# Patient Record
Sex: FEMALE | Race: BLACK OR AFRICAN AMERICAN | NOT HISPANIC OR LATINO | URBAN - METROPOLITAN AREA
[De-identification: names, ages, dates, MRNs, and addresses within clinical notes are randomized per-mention and may not be internally consistent; named-entity substitution may affect disease eponyms.]

---

## 2017-03-24 ENCOUNTER — OFFICE VISIT (OUTPATIENT)
Dept: OPHTHALMOLOGY | Facility: CLINIC | Age: 32
End: 2017-03-24

## 2017-03-24 DIAGNOSIS — H40.003 GLAUCOMA SUSPECT, BILATERAL: Primary | ICD-10-CM

## 2017-03-24 ASSESSMENT — GONIOSCOPY
OS_INFERIOR: 4
OD_INFERIOR: 4
OD_TEMPORAL: 4
OD_SUPERIOR: 4
OD_NASAL: 4
OS_SUPERIOR: 4
OS_NASAL: 4
OS_TEMPORAL: 4

## 2017-03-24 ASSESSMENT — VISUAL ACUITY
METHOD: SNELLEN - LINEAR
OD_SC+: -2
OS_SC+: -2
OS_SC: 20/20
OD_SC: 20/40
OD_PH_SC: 20/20

## 2017-03-24 ASSESSMENT — EXTERNAL EXAM - RIGHT EYE: OD_EXAM: NORMAL

## 2017-03-24 ASSESSMENT — SLIT LAMP EXAM - LIDS
COMMENTS: NORMAL
COMMENTS: NORMAL

## 2017-03-24 ASSESSMENT — TONOMETRY
OD_IOP_MMHG: 26
OS_IOP_MMHG: 26
IOP_METHOD: APPLANATION
OD_IOP_MMHG: 23
IOP_METHOD: APPLANATION
OS_IOP_MMHG: 23

## 2017-03-24 ASSESSMENT — EXTERNAL EXAM - LEFT EYE: OS_EXAM: NORMAL

## 2017-03-24 ASSESSMENT — CUP TO DISC RATIO
OS_RATIO: 0.2
OD_RATIO: 0.2

## 2017-03-24 NOTE — NURSING NOTE
Chief Complaints and History of Present Illnesses   Patient presents with     Glaucoma Follow Up     HPI    Last Eye Exam:  6/29/16   Affected eye(s):  Both   Symptoms:        Duration:  9 months   Frequency:  Constant       Do you have eye pain now?:  No      Comments:  Pt returns for Glaucoma follow up. Feels that vision is stable, denies any visual changes or complaints. Notes that she has not used any Latanoprost since 6-2016 per plan at last visit. EDEN THOMPSON, COA 8:44 AM 03/24/2017

## 2017-03-24 NOTE — MR AVS SNAPSHOT
After Visit Summary   3/24/2017    Queenie Rutledge    MRN: [id number]           Patient Information     Date Of Birth          1985        Visit Information        Provider Department      3/24/2017 9:30 AM Zaira Wilson MD [facility]        Today's Diagnoses     Glaucoma suspect, bilateral    -  1      Care Instructions    A long discussion of the risks, benefits, and alternatives including potential treatment and management options were had with patient and a decision was made to remain off drops at this time.  Patient will return to clinic in 8-12 months with repeat visual field test, OCT with RNFL analysis, dilated eye exam and IOP check.            Follow-ups after your visit        Follow-up notes from your care team     Return 8-12 months with repeat visual field test, OCT with RNFL analysis, dilated eye exam and IOP check.  .      Your next 10 appointments already scheduled     Mar 24, 2017  9:30 AM CDT   Return Visit with Zaira Wilson MD   [facility] ([facility])    [facility]  [address] [address] [phone]              Who to contact     Please call your clinic at [phone] to:    Ask questions about your health    Make or cancel appointments    Discuss your medicines    Learn about your test results    Speak to your doctor   If you have compliments or concerns about an experience at your clinic, or if you wish to file a complaint, please contact [facility] Patient Relations at [phone] or email us at [email or web address]         Additional Information About Your Visit        MyChart Information     Triond is an electronic gateway that provides easy, online access to your medical records. With Triond, you can request a clinic appointment, read your test results, renew a prescription or communicate with  your care team.     To sign up for Smarter Remarketerhart visit the website at [email or web address]   You will be asked to enter the access code listed below, as well as some personal information. Please follow the directions to create your username and password.     Your access code is: [email or web address]  Expires: 2017  9:22 AM     Your access code will  in 90 days. If you need help or a new code, please contact your [facility] or call [phone] for assistance.        Care EveryWhere ID     This is your Care EveryWhere ID. This could be used by other organizations to access your [place] medical records  [id number]         Blood Pressure from Last 3 Encounters:   No data found for BP    Weight from Last 3 Encounters:   No data found for Wt              We Performed the Following     GONIOSCOPY     OCT Optic Nerve RNFL Spectralis OU (both eyes)     OVF 24-2 Dynamic OU        Primary Care Provider Office Phone # Fax #    Bharat Lopez [phone]       [place] [address]        Thank you!     Thank you for choosing [facility]  for your care. Our goal is always to provide you with excellent care. Hearing back from our patients is one way we can continue to improve our services. Please take a few minutes to complete the written survey that you may receive in the mail after your visit with us. Thank you!             Your Updated Medication List - Protect others around you: Learn how to safely use, store and throw away your medicines at www.disposemymeds.org.          This list is accurate as of: 3/24/17  9:22 AM.  Always use your most recent med list.                   Brand Name Dispense Instructions for use    DEPO-PROVERA IM          latanoprost 0.005 % ophthalmic solution    XALATAN    7.5 mL    Place 1 drop into both eyes At Bedtime

## 2017-03-24 NOTE — PATIENT INSTRUCTIONS
A long discussion of the risks, benefits, and alternatives including potential treatment and management options were had with patient and a decision was made to remain off drops at this time.  Patient will return to clinic in 8-12 months with repeat visual field test, OCT with RNFL analysis, dilated eye exam and IOP check.

## 2017-03-24 NOTE — PROGRESS NOTES
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  Patient will return to clinic in 8-12 months with repeat visual field test, OCT with RNFL analysis, dilated eye exam and IOP check.      Attending Physician Attestation:  Complete documentation of historical and exam elements from today's encounter can be found in the full encounter summary report (not reduplicated in this progress note). I personally obtained the chief complaint(s) and history of present illness.  I confirmed and edited as necessary the review of systems, past medical/surgical history, family history, social history, and examination findings as documented by others; and I examined the patient myself. I personally reviewed the relevant tests, images, and reports as documented above. I formulated and edited as necessary the assessment and plan and discussed the findings and management plan with the patient and family.  - Zaira Wilson MD 9:02 AM 3/24/2017

## 2018-02-21 ENCOUNTER — TELEPHONE (OUTPATIENT)
Dept: DERMATOLOGY | Facility: CLINIC | Age: 33
End: 2018-02-21

## 2018-02-21 NOTE — TELEPHONE ENCOUNTER
Niurka Solitario contacted Queenie on 02/21/18 and left a message. If patient calls back please inform them of their appointment and go over pre visit.  Niurka Solitario MA

## 2018-05-01 ENCOUNTER — TELEPHONE (OUTPATIENT)
Dept: DERMATOLOGY | Facility: CLINIC | Age: 33
End: 2018-05-01

## 2018-05-01 NOTE — TELEPHONE ENCOUNTER
Dermatology Pre-visit Call:    Reason for visit : Hidradenitis- possibly       Was the patient referred: No    Has the patient seen a dermatologist in the past: Yes     Patient Reminders Given:  --Please, make sure you bring an updated list of your medications.   --Plan on being in our facility for approximately one hour, this includes the registration process, office visit, education and check-out process.  If you are having a procedure, more time may be required.     --If you are having a procedure, please, present 15 minutes early.  --Location reviewed.   --If you need to cancel or reschedule, call XXXX  --We look forward to seeing you in Dermatology Clinic.

## 2018-05-03 ENCOUNTER — OFFICE VISIT (OUTPATIENT)
Dept: DERMATOLOGY | Facility: CLINIC | Age: 33
End: 2018-05-03
Payer: COMMERCIAL

## 2018-05-03 DIAGNOSIS — L02.92 FURUNCULOSIS: ICD-10-CM

## 2018-05-03 DIAGNOSIS — E66.09 OBESITY DUE TO EXCESS CALORIES WITHOUT SERIOUS COMORBIDITY, UNSPECIFIED CLASSIFICATION: Primary | ICD-10-CM

## 2018-05-03 RX ORDER — CLINDAMYCIN PHOSPHATE 10 MG/G
GEL TOPICAL 2 TIMES DAILY
Qty: 60 G | Refills: 11 | Status: SHIPPED | OUTPATIENT
Start: 2018-05-03 | End: 2019-08-30

## 2018-05-03 ASSESSMENT — PAIN SCALES - GENERAL: PAINLEVEL: NO PAIN (0)

## 2018-05-03 NOTE — PATIENT INSTRUCTIONS
We suspect you to either have mild HS or recurrent furunculosis. We will plan to treat you with the following:  Benzoyl peroxide wash daily in the shower  Clindamycin 1% gel 1-2 times daily    We will see you back as needed.

## 2018-05-03 NOTE — NURSING NOTE
Dermatology Rooming Note    Queenie Rutledge's goals for this visit include:   Chief Complaint   Patient presents with     Derm Problem     Queenie is here to discuss possibly having HS on her inner thighs. States it's been going on for a couple years.       Anai Shelby LPN

## 2018-05-03 NOTE — LETTER
5/3/2018       RE: Queenie Rutledge  [address] [place] [address] [address]     Dear Colleague,    Thank you for referring your patient, Queenie Rutledge, to the [facility] DERMATOLOGY at [facility]. Please see a copy of my visit note below.    [facility] Dermatology Note    Dermatology Problem List:  1. 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 States it's been going on for a couple years.     History of Present Illness:  Ms. Queenie Rutledge is a 33 year old female who presents for evaluation of HS. Pt states that she has been having problems for a couple years involving eruptions in her inner thighs/legs. She states that she has been following with a dermatologist at [facility] in [place] for this. She says that this dermatologist has not started any definitive treatment for this. She is presenting today for a second opinion. Pt states that today is a good day and does not have any active areas. She says that at times though she will have difficulty walking due to the pain when she has active spots on her inner thighs. Pt denies any hx of axillary, breast, or buttock involvement. She states that her outbreaks on her inner thighs seem to be random and she is not sure the frequency of her outbreaks. Pt is quite concerned about these occurrences and would like to know what can be done about them.  Pt states she is otherwise healthy without any other medical problems. Does not regularly take any medications. She does not smoke. No family hx of HS.  Pt otherwise feels well without any changes in general state of health. Denies any new, growing, changing, bleeding, or otherwise concerning/symptomatic skin findings. No other questions or concerns today.      Past Medical History:    Patient Active Problem List   Diagnosis     Glaucoma suspect     Glaucoma suspect, bilateral     Past Medical History:   Diagnosis Date     Glaucoma      Past Surgical History:   Procedure Laterality Date     LASIK BILATERAL Bilateral 03/02/10       Social History:  The patient works as a clinic manager. 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    Family History:  Breast cancer FHx. No FHx of autoimmune disease or HS.     Medications:  Current Outpatient Prescriptions   Medication Sig Dispense Refill     latanoprost (XALATAN) 0.005 % ophthalmic solution Place 1 drop into both eyes At Bedtime (Patient not taking: Reported on 5/3/2018) 7.5 mL 3     MedroxyPROGESTERone Acetate (DEPO-PROVERA IM)           Allergies   Allergen Reactions     Amoxicillin    Review of Systems:  -As per HPI  -Constitutional: The patient denies fatigue, fevers, chills, unintended weight loss, and night sweats.  -HEENT: Patient denies nonhealing oral sores.  -Skin: As above in HPI. No additional skin concerns.    Physical exam:  Vitals: There were no vitals taken for this visit.  GEN: This is a well developed, well-nourished female in no acute distress, in a pleasant mood.    SKIN: Full skin, which includes the head/face, both arms, chest, back, abdomen,both legs, genitalia and/or groin buttocks, digits and/or nails, was examined.  - FP V  - non-tender, slightly hyperpigmented dermal nodule on right upper medial thigh  - few scattered large comedones and cystic changes involving b/l superomedial thighs  - no active HS-appearing lesions in areas examined  - xerotic legs b/l  - no other lesions of concern on areas examined.     Impression/Plan:  1. 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 Pt with no active involvement on exam today. Difficult to definitively weigh in today due to lack of disease activity. No e/o scarring c/w HS. Does have some larger  comedones,involving b/l thighs. Ddx at this time includes mild HS vs recurrent furunculosis. Will begin  -  BPO 10% wash daily  - clindamycin 1% gel BID  - counseled in importance of weight reduction  - f/u as needed    2. Obesity  - Pt noted she was interested in learning about liposuction. I placed a consult for plastic surgery.    Follow-up prn for new or changing lesions.     Dr. May staffed the patient.    Staff Involved:  Resident(Alex Alaniz)/Staff(as above)    Staff Physician Comments:   I saw and evaluated the patient with the resident and I agree with the assessment and plan.  I was present for the examination.  Jay May MD, FAAD    Departments of Internal Medicine and Dermatology  [facility]  [phone]

## 2018-05-03 NOTE — MR AVS SNAPSHOT
After Visit Summary   5/3/2018    Queenie Rutledge    MRN: [id number]           Patient Information     Date Of Birth          1985        Visit Information        Provider Department      5/3/2018 3:00 PM Jay May MD [facility] Dermatology        Today's Diagnoses     Dermatitis    -  1      Care Instructions    We suspect you to either have mild HS or recurrent furunculosis. We will plan to treat you with the following:  Benzoyl peroxide wash daily in the shower  Clindamycin 1% gel 1-2 times daily    We will see you back as needed.          Follow-ups after your visit        Follow-up notes from your care team     Return if symptoms worsen or fail to improve.      Who to contact     Please call your clinic at [phone] to:    Ask questions about your health    Make or cancel appointments    Discuss your medicines    Learn about your test results    Speak to your doctor            Additional Information About Your Visit        MyChart Information     TeamLINKS is an electronic gateway that provides easy, online access to your medical records. With TeamLINKS, you can request a clinic appointment, read your test results, renew a prescription or communicate with your care team.     To sign up for CatchThatBust visit the website at [email or web address]   You will be asked to enter the access code listed below, as well as some personal information. Please follow the directions to create your username and password.     Your access code is: [email or web address]  Expires: 2018  7:30 AM     Your access code will  in 90 days. If you need help or a new code, please contact your [facility] or call [phone] for assistance.        Care EveryWhere ID     This is your Care EveryWhere ID. This could be used by other organizations to access your [place] medical records  [id number]         Blood Pressure from Last 3 Encounters:   No data found for BP    Weight from  Last 3 Encounters:   No data found for Wt              Today, you had the following     No orders found for display         Today's Medication Changes          These changes are accurate as of 5/3/18  4:19 PM.  If you have any questions, ask your nurse or doctor.               Start taking these medicines.        Dose/Directions    benzoyl peroxide 5 % Liqd   Used for:  Dermatitis   Started by:  Jay May MD        Use daily as directed   Quantity:  226 g   Refills:  3       clindamycin 1 % topical gel   Commonly known as:  CLINDAMAX   Used for:  Dermatitis   Started by:  Jay May MD        Apply topically 2 times daily   Quantity:  60 g   Refills:  11            Where to get your medicines      These medications were sent to [facility] [address] [place] [address]     Phone:  [phone]     benzoyl peroxide 5 % Liqd    clindamycin 1 % topical gel                Primary Care Provider Office Phone # Fax #    Bharat Lopez [phone]       [address] [address]        Equal Access to Services     [facility]: Hadii quirino riojas hadasho Soomaali, waaxda luqadaha, qaybta kaalmada adeegyada, michelle cheema . So [facility] [phone].    ATENCIÓN: Si habla español, tiene a viera disposición servicios gratuitos de asistencia lingüística. Llame al [phone].    We comply with applicable federal civil rights laws and [place] laws. We do not discriminate on the basis of race, color, national origin, age, disability, sex, sexual orientation, or gender identity.            Thank you!     Thank you for choosing [facility] DERMATOLOGY  for your care. Our goal is always to provide you with excellent care. Hearing back from our patients is one way we can continue to improve our services. Please take a few minutes to complete the  written survey that you may receive in the mail after your visit with us. Thank you!             Your Updated Medication List - Protect others around you: Learn how to safely use, store and throw away your medicines at www.disposemymeds.org.          This list is accurate as of 5/3/18  4:19 PM.  Always use your most recent med list.                   Brand Name Dispense Instructions for use Diagnosis    benzoyl peroxide 5 % Liqd     226 g    Use daily as directed    Dermatitis       clindamycin 1 % topical gel    CLINDAMAX    60 g    Apply topically 2 times daily    Dermatitis       DEPO-PROVERA IM           latanoprost 0.005 % ophthalmic solution    XALATAN    7.5 mL    Place 1 drop into both eyes At Bedtime    Glaucoma suspect, bilateral

## 2018-05-03 NOTE — PROGRESS NOTES
[facility] Dermatology Note    Dermatology Problem List:  1. 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 States it's been going on for a couple years.         History of Present Illness:  Ms. Queenie Rutledge is a 33 year old female who presents for evaluation of HS. Pt states that she has been having problems for a couple years involving eruptions in her inner thighs/legs. She states that she has been following with a dermatologist at [facility] in [place] for this. She says that this dermatologist has not started any definitive treatment for this. She is presenting today for a second opinion. Pt states that today is a good day and does not have any active areas. She says that at times though she will have difficulty walking due to the pain when she has active spots on her inner thighs. Pt denies any hx of axillary, breast, or buttock involvement. She states that her outbreaks on her inner thighs seem to be random and she is not sure the frequency of her outbreaks. Pt is quite concerned about these occurrences and would like to know what can be done about them.  Pt states she is otherwise healthy without any other medical problems. Does not regularly take any medications. She does not smoke. No family hx of HS.  Pt otherwise feels well without any changes in general state of health. Denies any new, growing, changing, bleeding, or otherwise concerning/symptomatic skin findings. No other questions or concerns today.      Past Medical History:   Patient Active Problem List   Diagnosis     Glaucoma suspect     Glaucoma suspect, bilateral     Past Medical History:   Diagnosis Date     Glaucoma      Past Surgical History:   Procedure Laterality Date     LASIK BILATERAL Bilateral 03/02/10       Social History:  The  patient works as a clinic manager. 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    Family History:  Breast cancer FHx. No FHx of autoimmune disease or HS.     Medications:  Current Outpatient Prescriptions   Medication Sig Dispense Refill     latanoprost (XALATAN) 0.005 % ophthalmic solution Place 1 drop into both eyes At Bedtime (Patient not taking: Reported on 5/3/2018) 7.5 mL 3     MedroxyPROGESTERone Acetate (DEPO-PROVERA IM)           Allergies   Allergen Reactions     Amoxicillin          Review of Systems:  -As per HPI  -Constitutional: The patient denies fatigue, fevers, chills, unintended weight loss, and night sweats.  -HEENT: Patient denies nonhealing oral sores.  -Skin: As above in HPI. No additional skin concerns.    Physical exam:  Vitals: There were no vitals taken for this visit.  GEN: This is a well developed, well-nourished female in no acute distress, in a pleasant mood.    SKIN: Full skin, which includes the head/face, both arms, chest, back, abdomen,both legs, genitalia and/or groin buttocks, digits and/or nails, was examined.  - FP V  - non-tender, slightly hyperpigmented dermal nodule on right upper medial thigh  - few scattered large comedones and cystic changes involving b/l superomedial thighs  - no active HS-appearing lesions in areas examined  - xerotic legs b/l  - no other lesions of concern on areas examined.     Impression/Plan:  1. 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 Pt with no active involvement on exam today. Difficult to definitively weigh in today due to lack of disease activity. No e/o scarring c/w HS. Does have some larger comedones,involving b/l thighs. Ddx at this time includes mild HS vs recurrent furunculosis. Will begin  -  BPO 10% wash daily  - clindamycin 1% gel BID  - counseled in importance of weight reduction  - f/u as needed    2. Obesity  - Pt noted she was interested in learning about  liposuction. I placed a consult for plastic surgery.    Follow-up prn for new or changing lesions.       Dr. May staffed the patient.    Staff Involved:  Resident(Alex Alaniz)/Staff(as above)    Staff Physician Comments:   I saw and evaluated the patient with the resident and I agree with the assessment and plan.  I was present for the examination.    Jay May MD, FAAD    Departments of Internal Medicine and Dermatology  [facility]  [phone]

## 2018-05-08 ENCOUNTER — TELEPHONE (OUTPATIENT)
Dept: DERMATOLOGY | Facility: CLINIC | Age: 33
End: 2018-05-08

## 2018-05-08 NOTE — TELEPHONE ENCOUNTER
[facility] Call Center    Phone Message    May a detailed message be left on voicemail: yes    Reason for Call: Other: Pt and Dr. May discussed inner thigh lipo during last appt.  Pt requesting a recommendation for a surgeon for this procedure from Dr. May.  Call pt to let her know.     Action Taken: Message routed to:  [facility] Dermatology Clinic

## 2018-05-11 NOTE — TELEPHONE ENCOUNTER
I spoke to Queenie Rutledge and gave her Dr. May's recommendations of Dr. Rendon or Dr. Oliva.  Patient understood and had no further questions or concerns.

## 2018-07-16 ENCOUNTER — TELEPHONE (OUTPATIENT)
Dept: DERMATOLOGY | Facility: CLINIC | Age: 33
End: 2018-07-16

## 2018-07-16 NOTE — TELEPHONE ENCOUNTER
[facility]    Phone Message    May a detailed message be left on voicemail: yes    Reason for Call: Other: Pt of Dr. May called requesting a prescription for Ibuprofen. Pt says the 200mgs OTC pills that she buys, she typically has to take between 600-800mgs. Pt stated she takes Ibuprofen to manage the condition she see's Dr. May for. Pt uses [address]     Action Taken: Message routed to:  [facility] Derm

## 2018-07-17 NOTE — TELEPHONE ENCOUNTER
Left voicemail asking to call clinic back to get her scheduled for a follow up with Dr. May. Want to know if she can come in at 6:30 pm on 8/2/18.  Clinic number provided.

## 2018-07-17 NOTE — TELEPHONE ENCOUNTER
Spoke with patient and relayed message. Queenie would like to request to be sent to Dr. May , routing to provider.

## 2018-07-17 NOTE — TELEPHONE ENCOUNTER
"Spoke with Queenie and relayed the message from MARCUS. Queenie states \" It is too hard to take all these pills and since I see Dr. May for my problem that's why I'm asking him.\" Patient adamant about receiving rx. Will route to MARCUS.   "

## 2019-07-30 DIAGNOSIS — J02.0 STREPTOCOCCAL SORE THROAT: Primary | ICD-10-CM

## 2019-07-30 DIAGNOSIS — J00 ACUTE NASOPHARYNGITIS: Primary | ICD-10-CM

## 2019-07-30 LAB
DEPRECATED S PYO AG THROAT QL EIA: NORMAL
SPECIMEN SOURCE: NORMAL

## 2019-07-30 PROCEDURE — 87880 STREP A ASSAY W/OPTIC: CPT | Performed by: INTERNAL MEDICINE

## 2019-07-30 PROCEDURE — 87081 CULTURE SCREEN ONLY: CPT | Performed by: INTERNAL MEDICINE

## 2019-07-31 LAB
BACTERIA SPEC CULT: NORMAL
SPECIMEN SOURCE: NORMAL

## 2019-08-26 ENCOUNTER — TELEPHONE (OUTPATIENT)
Dept: DERMATOLOGY | Facility: CLINIC | Age: 34
End: 2019-08-26

## 2019-08-26 NOTE — TELEPHONE ENCOUNTER
[facility]    Phone Message    May a detailed message be left on voicemail: yes    Reason for Call: Other: Pt would like appt with Dr. May, nothing on schedule. Please call pt to discuss.     Action Taken: Message routed to:  [facility] Derm

## 2019-08-30 ENCOUNTER — OFFICE VISIT (OUTPATIENT)
Dept: DERMATOLOGY | Facility: CLINIC | Age: 34
End: 2019-08-30
Payer: COMMERCIAL

## 2019-08-30 VITALS — DIASTOLIC BLOOD PRESSURE: 77 MMHG | SYSTOLIC BLOOD PRESSURE: 109 MMHG | HEART RATE: 83 BPM

## 2019-08-30 DIAGNOSIS — L73.2 HIDRADENITIS SUPPURATIVA: Primary | ICD-10-CM

## 2019-08-30 RX ORDER — CLINDAMYCIN PHOSPHATE 10 UG/ML
LOTION TOPICAL
Qty: 60 ML | Refills: 3 | Status: SHIPPED | OUTPATIENT
Start: 2019-08-30 | End: 2020-10-28

## 2019-08-30 ASSESSMENT — PAIN SCALES - GENERAL: PAINLEVEL: MODERATE PAIN (5)

## 2019-08-30 NOTE — PATIENT INSTRUCTIONS
You have a condition called hidradenitis suppurativa or recurrent furuculosis      PLAN TODAY:  - Keep using the benzoyl peroxide wash in the shower. This is a good treatment to use every day. I sent you another prescription but you can also keep getting it over the counter.  - When you get an inflamed bump, use the clindamycin lotion twice a day even if it is draining.  - Start back on the birth control with an OBGYN      The pill we talked about today is called spironolactone. This is a pill that works by affecting the hormone levels (androgens) that increase oil gland secretion. Side effects that we can see include breast tenderness, menstrual spotting, and lightheadedness but that usually goes away within the first few weeks.

## 2019-08-30 NOTE — PROGRESS NOTES
"[facility] Dermatology Note       Dermatology Problem List:  1. Mild hidradenitis suppurativa vs recurrent furunculosis - axilla, groin. Favor HS due to history of pilonidal cyst.  - Current tx: BPO 5% wash daily, Clindamycin 1% BID when flares, birth control  - Future considerations: Spironolactone    Encounter Date: Aug 30, 2019    CC:  Chief Complaint   Patient presents with     Derm Problem     HS - Queenie has a flare up in her groin and her right armpit         History of Present Illness:  Ms. Queenie Rutledge is a 34 year old female who presents as a follow-up for hidradenitis suppurativa. The patient was last seen 5/3/18 by Jay May MD, when she started benzoyl peroxide wash and clindamycin gel. Today, the patient reports that she had been doing well since her last visit, but she has since developed a lesion in her groin that she describes as a \"knot\" and believes to be a cyst which has been bleeding consistently with associated tenderness to palpation. There is a similar lesion in her right axilla that she states is not as severe and has not drained any contents. She has not used the topical clindamycin recently, but she has been using an OTC benzoyl peroxide wash intermittently when she believes she is developing a new lesion. She states that she develops lesions fairly regularly and seems to always have one present. Every time she develops a new lesion, there is a fair amount of associated pain, but she has been taking OTC ibuprofen with relief. She believes there may be a hormonal component as she typically develops lesions around her menstrual period, and she never developed these lesions when she was taking OCPs. Of note, when she was 18, she developed a lesion over her tailbone which was initially believed to be a pilonidal cyst, but she inquires as to whether this could be related to her existing lesions. The patient voices no other concerns.       Past Medical History: "   Patient Active Problem List   Diagnosis     Glaucoma suspect     Glaucoma suspect, bilateral     Past Medical History:   Diagnosis Date     Glaucoma      Past Surgical History:   Procedure Laterality Date     LASIK BILATERAL Bilateral 03/02/10       Social History:  Patient reports that she has never smoked. She has never used smokeless tobacco.    Family History:  Family History   Problem Relation Age of Onset     Diabetes No family hx of      Glaucoma No family hx of      Macular Degeneration No family hx of      Amblyopia No family hx of      Retinal detachment No family hx of      Thyroid Disease No family hx of      Melanoma No family hx of      Skin Cancer No family hx of        Medications:  Current Outpatient Medications   Medication Sig Dispense Refill     benzoyl peroxide 5 % external liquid Use daily as a body wash as directed. 226 g 11     clindamycin (CLEOCIN T) 1 % external lotion Apply twice daily to any inflamed bumps on the skin as needed. 60 mL 3     MedroxyPROGESTERone Acetate (DEPO-PROVERA IM)        latanoprost (XALATAN) 0.005 % ophthalmic solution Place 1 drop into both eyes At Bedtime (Patient not taking: Reported on 5/3/2018) 7.5 mL 3       Allergies   Allergen Reactions     Amoxicillin        Review of Systems:  -Constitutional: Otherwise feeling well today, in usual state of health.  -HEENT: Patient denies nonhealing oral sores.  -Skin: As above in HPI. No additional skin concerns.    Physical Exam:  Vitals: /77 (BP Location: Right arm, Patient Position: Sitting, Cuff Size: Adult Regular)   Pulse 83   GEN: This is a well developed, well-nourished female in no acute distress, in a pleasant mood.    SKIN: Focused examination of the axillae and groin was performed.  - Forman skin type: V  - 8mm subcutaneous papule in the R axilla with no overlying punctum. Axillae otherwise clear.  - Few hyperpigmented macules on the upper inner thighs consistent with post-inflammatory  hyperpigmentation.  - Draining papule on the left upper inner thigh.  - No other lesions of concern on areas examined.       Impression/Plan:  1. Favor mild hidradenitis suppurativa vs less likely recurrent furunculosis. Hidradenitis suppurativa is favored due to patient presentation and history of pilonidal cyst at age 18.    We discussed the potential etiologies as well as the risks, benefits, and efficacy of treatment with a topical regimen or addition of oral medications such as contraceptives or spironolactone. Today, we will optimize her topical regimen and patient will see OB/GYN to be restarted on her birth control (Depot injection), as she noted improvement in the past    Offered intralesional triamcinolone today for two inflamed areas, however patient declines as they seem to be resolving on their own.    Restart daily benzoyl peroxide 5% wash in the shower. Warned of bleached fabrics.    Restart topical clindamycin 1% lotion to use twice daily when inflamed lesions.    An option for future consideration at next appointment is spironolactone given her reported history of menstrual flares.      CC Bharat Lopez MD  [facility]  [address] [place] [address] on close of this encounter.  Follow-up in 3-6 months, earlier for new or changing lesions.     Dr. Cameron staffed the patient      Staff Involved:  Scribe/Resident (Marjan Gonsaelz)/Staff    Scribe Disclosure  I, Dominic Najjar, am serving as a scribe to document services personally performed by Dr. Navarro Cameron MD, based on data collection and the provider's statements to me.    Staff Physician Comments:   I saw and evaluated the patient with the resident and I edited the assessment and plan as documented in the note. I was present for the examination.    Provider Disclosure:   The documentation recorded by the scribe accurately reflects the services I personally performed and the decisions made by me.    Navarro Cameron,  MD   of Dermatology  Department of Dermatology  [employer] [employer] [facility]

## 2019-08-30 NOTE — LETTER
"8/30/2019       RE: Queenie Rutledge  [address] [facility] [facility] [address]     Dear Colleague,    Thank you for referring your patient, Queenie Rutledge, to the [facility] DERMATOLOGY at [facility]. Please see a copy of my visit note below.    [facility] Dermatology Note       Dermatology Problem List:  1. Mild hidradenitis suppurativa vs recurrent furunculosis - axilla, groin. Favor HS due to history of pilonidal cyst.  - Current tx: BPO 5% wash daily, Clindamycin 1% BID when flares, birth control  - Future considerations: Spironolactone    Encounter Date: Aug 30, 2019    CC:  Chief Complaint   Patient presents with     Derm Problem     HS - Queenie has a flare up in her groin and her right armpit         History of Present Illness:  Ms. Queenie Rutledge is a 34 year old female who presents as a follow-up for hidradenitis suppurativa. The patient was last seen 5/3/18 by Jay May MD, when she started benzoyl peroxide wash and clindamycin gel. Today, the patient reports that she had been doing well since her last visit, but she has since developed a lesion in her groin that she describes as a \"knot\" and believes to be a cyst which has been bleeding consistently with associated tenderness to palpation. There is a similar lesion in her right axilla that she states is not as severe and has not drained any contents. She has not used the topical clindamycin recently, but she has been using an OTC benzoyl peroxide wash intermittently when she believes she is developing a new lesion. She states that she develops lesions fairly regularly and seems to always have one present. Every time she develops a new lesion, there is a fair amount of associated pain, but she has been taking OTC ibuprofen with relief. She believes there may be a hormonal component as she typically develops lesions around her menstrual period, and she never developed these lesions when she was taking " OCPs. Of note, when she was 18, she developed a lesion over her tailbone which was initially believed to be a pilonidal cyst, but she inquires as to whether this could be related to her existing lesions. The patient voices no other concerns.       Past Medical History:   Patient Active Problem List   Diagnosis     Glaucoma suspect     Glaucoma suspect, bilateral     Past Medical History:   Diagnosis Date     Glaucoma      Past Surgical History:   Procedure Laterality Date     LASIK BILATERAL Bilateral 03/02/10       Social History:  Patient reports that she has never smoked. She has never used smokeless tobacco.    Family History:  Family History   Problem Relation Age of Onset     Diabetes No family hx of      Glaucoma No family hx of      Macular Degeneration No family hx of      Amblyopia No family hx of      Retinal detachment No family hx of      Thyroid Disease No family hx of      Melanoma No family hx of      Skin Cancer No family hx of        Medications:  Current Outpatient Medications   Medication Sig Dispense Refill     benzoyl peroxide 5 % external liquid Use daily as a body wash as directed. 226 g 11     clindamycin (CLEOCIN T) 1 % external lotion Apply twice daily to any inflamed bumps on the skin as needed. 60 mL 3     MedroxyPROGESTERone Acetate (DEPO-PROVERA IM)        latanoprost (XALATAN) 0.005 % ophthalmic solution Place 1 drop into both eyes At Bedtime (Patient not taking: Reported on 5/3/2018) 7.5 mL 3       Allergies   Allergen Reactions     Amoxicillin        Review of Systems:  -Constitutional: Otherwise feeling well today, in usual state of health.  -HEENT: Patient denies nonhealing oral sores.  -Skin: As above in HPI. No additional skin concerns.    Physical Exam:  Vitals: /77 (BP Location: Right arm, Patient Position: Sitting, Cuff Size: Adult Regular)   Pulse 83   GEN: This is a well developed, well-nourished female in no acute distress, in a pleasant mood.    SKIN: Focused  examination of the axillae and groin was performed.  - Forman skin type: V  - 8mm subcutaneous papule in the R axilla with no overlying punctum. Axillae otherwise clear.  - Few hyperpigmented macules on the upper inner thighs consistent with post-inflammatory hyperpigmentation.  - Draining papule on the left upper inner thigh.  - No other lesions of concern on areas examined.       Impression/Plan:  1. Favor mild hidradenitis suppurativa vs less likely recurrent furunculosis. Hidradenitis suppurativa is favored due to patient presentation and history of pilonidal cyst at age 18.    We discussed the potential etiologies as well as the risks, benefits, and efficacy of treatment with a topical regimen or addition of oral medications such as contraceptives or spironolactone. Today, we will optimize her topical regimen and patient will see OB/GYN to be restarted on her birth control (Depot injection), as she noted improvement in the past    Offered intralesional triamcinolone today for two inflamed areas, however patient declines as they seem to be resolving on their own.    Restart daily benzoyl peroxide 5% wash in the shower. Warned of bleached fabrics.    Restart topical clindamycin 1% lotion to use twice daily when inflamed lesions.    An option for future consideration at next appointment is spironolactone given her reported history of menstrual flares.      CC Bharat Lopez MD  [facility]  [address] [place] [address] on close of this encounter.  Follow-up in 3-6 months, earlier for new or changing lesions.     Dr. Cameron staffed the patient      Staff Involved:  Scribe/Resident (Marjan Gonsalez)/Staff    Scribe Disclosure  I, Dominic Najjar, am serving as a scribe to document services personally performed by Dr. Navarro Cameron MD, based on data collection and the provider's statements to me.    Staff Physician Comments:   I saw and evaluated the patient with the resident and I edited  the assessment and plan as documented in the note. I was present for the examination.    Provider Disclosure:   The documentation recorded by the scribe accurately reflects the services I personally performed and the decisions made by me.    Navarro Cameron MD   of Dermatology  Department of Dermatology  [employer]

## 2019-11-18 ENCOUNTER — OFFICE VISIT (OUTPATIENT)
Dept: DERMATOLOGY | Facility: CLINIC | Age: 34
End: 2019-11-18
Payer: COMMERCIAL

## 2019-11-18 VITALS — HEART RATE: 75 BPM | SYSTOLIC BLOOD PRESSURE: 109 MMHG | DIASTOLIC BLOOD PRESSURE: 71 MMHG

## 2019-11-18 DIAGNOSIS — L73.2 HIDRADENITIS SUPPURATIVA: Primary | ICD-10-CM

## 2019-11-18 ASSESSMENT — PAIN SCALES - GENERAL: PAINLEVEL: NO PAIN (0)

## 2019-11-18 NOTE — PROGRESS NOTES
[facility] Dermatology Note      Dermatology Problem List:  1. Mild hidradenitis suppurativa vs recurrent furunculosis - axilla, groin. Favor HS due to history of pilonidal cyst.  - Current tx: BPO 5% wash daily, Clindamycin 1% BID when flares, birth control  - Future considerations: Spironolactone    Encounter Date: Nov 18, 2019    CC:  Chief Complaint   Patient presents with     Derm Problem     Queenie is here today for a 3 month HS follow up          History of Present Illness:  Ms. Queenie Rutledge is a 34 year old female who presents as a follow-up for hidradenitis suppurativa. The patient was last seen 8/30/19 when the patient was continued on treatment plan of BPO 5% wash daily and Clindamycin 1% BID when flares and recommended starting birth control. Patient reports starting Depo-Provera on 9/1/19. She states it has been effective in preventing flares of red bumps in the groin and armpits. Since her last visit in August she has only had one flare on right medial thigh that has been present for one week. It is not painful, itchy or draining. She has another lesion on her left medial thigh that was present at last visit. Since then the skin of this lesion has become more soft and fragile as if it can easily rupture. Patient reports she has been using BPO wash less frequently since the Depo-Provera has been effective in controlling her flares. She reports using BPO wash once every 2 weeks. Patient also reports a 20 lb weight loss since last visit that she attributes to diet and exercise. Patient reports no other concerns.    Past Medical History:   Patient Active Problem List   Diagnosis     Glaucoma suspect     Glaucoma suspect, bilateral     Past Medical History:   Diagnosis Date     Glaucoma      Past Surgical History:   Procedure Laterality Date     LASIK BILATERAL Bilateral 03/02/10       Social History:  Patient reports that she has never smoked. She has never used smokeless tobacco.  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 She is single, no children, and lives in [place].     Family History:  Family History   Problem Relation Age of Onset     Diabetes No family hx of      Glaucoma No family hx of      Macular Degeneration No family hx of      Amblyopia No family hx of      Retinal detachment No family hx of      Thyroid Disease No family hx of      Melanoma No family hx of      Skin Cancer No family hx of        Medications:  Current Outpatient Medications   Medication Sig Dispense Refill     benzoyl peroxide 5 % external liquid Use daily as a body wash as directed. 226 g 11     chlorhexidine (HIBICLENS) 4 % liquid Apply topically daily 473 mL 5     clindamycin (CLEOCIN T) 1 % external lotion Apply twice daily to any inflamed bumps on the skin as needed. 60 mL 3     MedroxyPROGESTERone Acetate (DEPO-PROVERA IM)        latanoprost (XALATAN) 0.005 % ophthalmic solution Place 1 drop into both eyes At Bedtime (Patient not taking: Reported on 5/3/2018) 7.5 mL 3     Allergies   Allergen Reactions     Amoxicillin          Review of Systems:  -As per HPI  -Constitutional: Otherwise feeling well today, in usual state of health.  -HEENT: Patient denies nonhealing oral sores.  -Skin: As above in HPI. No additional skin concerns.    Physical exam:  Vitals: /71   Pulse 75   GEN: This is a well developed, well-nourished female in no acute distress, in a pleasant mood.    SKIN: Focused examination of the groin was performed.  -Forman skin type: VI  -1 cm pink atrophic scar on left medial thigh  -1 mm tan subcutaneous folliculocentric papule on right medial thigh  -1 mm tan subcutaneous folliculocentric papule on right axilla  -few scattered comedones in the groin  -No other lesions of concern on areas examined.     Impression/Plan:  1. Hidradenitis suppurativa vs less likely recurrent furunculosis. Hidradenitis suppurativa is favored due to patient  presentation and history of pilonidal cyst at age 18. Minimal disease activity    Continue Depo-Provera    Stop benzoyl peroxide 5% wash     Start chlorhexadine 4% wash     Continue topical clindamycin 1% lotion 2x daily when inflamed lesions    IL triamcinolone discussed as option for future consideration when inflamed lesions    Spironolactone discuessed as option for future consideration.    Follow-up in 6 months, earlier for new or changing lesions.     Staff Involved:  I,Dominguez Lei, MS3, saw and examined the patient in the presence of Dr. Cameron.    Staff Physician:  I was present with the medical student who participated in the service and in the documentation of the note. I have verified the history and personally performed the physical exam and medical decision making. I edited the assessment and plan of care as documented in the note.     Navarro Cameron MD   of Dermatology  Department of Dermatology  [facility] [employer] [facility]

## 2019-11-18 NOTE — LETTER
11/18/2019       RE: Queenie Rutledge  [address] [address]     Dear Colleague,    Thank you for referring your patient, Queenie Rutledge, to the [facility] DERMATOLOGY at [facility]. Please see a copy of my visit note below.    [facility] Dermatology Note      Dermatology Problem List:  1. Mild hidradenitis suppurativa vs recurrent furunculosis - axilla, groin. Favor HS due to history of pilonidal cyst.  - Current tx: BPO 5% wash daily, Clindamycin 1% BID when flares, birth control  - Future considerations: Spironolactone    Encounter Date: Nov 18, 2019    CC:  Chief Complaint   Patient presents with     Derm Problem     Queenie is here today for a 3 month HS follow up          History of Present Illness:  Ms. Queenie Rutledge is a 34 year old female who presents as a follow-up for hidradenitis suppurativa. The patient was last seen 8/30/19 when the patient was continued on treatment plan of BPO 5% wash daily and Clindamycin 1% BID when flares and recommended starting birth control. Patient reports starting Depo-Provera on 9/1/19. She states it has been effective in preventing flares of red bumps in the groin and armpits. Since her last visit in August she has only had one flare on right medial thigh that has been present for one week. It is not painful, itchy or draining. She has another lesion on her left medial thigh that was present at last visit. Since then the skin of this lesion has become more soft and fragile as if it can easily rupture. Patient reports she has been using BPO wash less frequently since the Depo-Provera has been effective in controlling her flares. She reports using BPO wash once every 2 weeks. Patient also reports a 20 lb weight loss since last visit that she attributes to diet and exercise. Patient reports no other concerns.    Past Medical History:   Patient Active Problem List   Diagnosis     Glaucoma suspect      Glaucoma suspect, bilateral     Past Medical History:   Diagnosis Date     Glaucoma      Past Surgical History:   Procedure Laterality Date     LASIK BILATERAL Bilateral 03/02/10       Social History:  Patient reports that she has never smoked. She has never used smokeless tobacco. 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     Family History:  Family History   Problem Relation Age of Onset     Diabetes No family hx of      Glaucoma No family hx of      Macular Degeneration No family hx of      Amblyopia No family hx of      Retinal detachment No family hx of      Thyroid Disease No family hx of      Melanoma No family hx of      Skin Cancer No family hx of        Medications:  Current Outpatient Medications   Medication Sig Dispense Refill     benzoyl peroxide 5 % external liquid Use daily as a body wash as directed. 226 g 11     chlorhexidine (HIBICLENS) 4 % liquid Apply topically daily 473 mL 5     clindamycin (CLEOCIN T) 1 % external lotion Apply twice daily to any inflamed bumps on the skin as needed. 60 mL 3     MedroxyPROGESTERone Acetate (DEPO-PROVERA IM)        latanoprost (XALATAN) 0.005 % ophthalmic solution Place 1 drop into both eyes At Bedtime (Patient not taking: Reported on 5/3/2018) 7.5 mL 3     Allergies   Allergen Reactions     Amoxicillin          Review of Systems:  -As per HPI  -Constitutional: Otherwise feeling well today, in usual state of health.  -HEENT: Patient denies nonhealing oral sores.  -Skin: As above in HPI. No additional skin concerns.    Physical exam:  Vitals: /71   Pulse 75   GEN: This is a well developed, well-nourished female in no acute distress, in a pleasant mood.    SKIN: Focused examination of the groin was performed.  -Forman skin type: VI  -1 cm pink atrophic scar on left medial thigh  -1 mm tan subcutaneous folliculocentric papule on right medial thigh  -1 mm tan  subcutaneous folliculocentric papule on right axilla  -few scattered comedones in the groin  -No other lesions of concern on areas examined.     Impression/Plan:  1. Hidradenitis suppurativa vs less likely recurrent furunculosis. Hidradenitis suppurativa is favored due to patient presentation and history of pilonidal cyst at age 18. Minimal disease activity    Continue Depo-Provera    Stop benzoyl peroxide 5% wash     Start chlorhexadine 4% wash     Continue topical clindamycin 1% lotion 2x daily when inflamed lesions    IL triamcinolone discussed as option for future consideration when inflamed lesions    Spironolactone discuessed as option for future consideration.    Follow-up in 6 months, earlier for new or changing lesions.     Staff Involved:  I,Dominguez Lei, MS3, saw and examined the patient in the presence of Dr. Cameron.    Staff Physician:  I was present with the medical student who participated in the service and in the documentation of the note. I have verified the history and personally performed the physical exam and medical decision making. I edited the assessment and plan of care as documented in the note.     Navarro Cameron MD   of Dermatology  Department of Dermatology  [facility]

## 2020-03-10 ENCOUNTER — HEALTH MAINTENANCE LETTER (OUTPATIENT)
Age: 35
End: 2020-03-10

## 2020-06-19 ENCOUNTER — TELEPHONE (OUTPATIENT)
Dept: OPHTHALMOLOGY | Facility: CLINIC | Age: 35
End: 2020-06-19

## 2020-06-19 NOTE — TELEPHONE ENCOUNTER
H/o glaucoma suspect/LASIK in 2010  Pt last seen in 2017    Pt reporting more constant blurring of vision in both eyes in past month     Offered appt next Monday or following week in [facility]    Scheduled July 6th in afternoon with Dr. Orozco    Pt aware of date/time/location at [facility]  Tre Simomns RN 12:53 PM 06/25/20    --    Left message with direct number at [phone]  Tre Simmons RN 8:50 AM 06/25/20        [facility] Center    Phone Message    May a detailed message be left on voicemail: yes     Reason for Call: Other: Pt requesting eye exam, she states she was a [facility] pt. Her last visit was with Dr. Wilson in 3/2017. Pt states she has a hx of glaucoma, and notes she has not had changes in her vision, but is requesting a sooner appt with clinic. Please advise.     Action Taken: Message routed to:  [facility] Ophthalmology    Travel Screening: Not Applicable

## 2020-07-06 ENCOUNTER — OFFICE VISIT (OUTPATIENT)
Dept: OPHTHALMOLOGY | Facility: CLINIC | Age: 35
End: 2020-07-06
Attending: OPHTHALMOLOGY
Payer: COMMERCIAL

## 2020-07-06 DIAGNOSIS — H40.003 GLAUCOMA SUSPECT OF BOTH EYES: Primary | ICD-10-CM

## 2020-07-06 DIAGNOSIS — H52.7 REFRACTIVE ERROR: ICD-10-CM

## 2020-07-06 DIAGNOSIS — Z98.890 S/P LASIK (LASER ASSISTED IN SITU KERATOMILEUSIS) OF BOTH EYES: ICD-10-CM

## 2020-07-06 PROCEDURE — 92015 DETERMINE REFRACTIVE STATE: CPT | Mod: ZF

## 2020-07-06 PROCEDURE — 92133 CPTRZD OPH DX IMG PST SGM ON: CPT | Mod: ZF | Performed by: STUDENT IN AN ORGANIZED HEALTH CARE EDUCATION/TRAINING PROGRAM

## 2020-07-06 PROCEDURE — G0463 HOSPITAL OUTPT CLINIC VISIT: HCPCS | Mod: ZF

## 2020-07-06 PROCEDURE — 92083 EXTENDED VISUAL FIELD XM: CPT | Mod: ZF | Performed by: STUDENT IN AN ORGANIZED HEALTH CARE EDUCATION/TRAINING PROGRAM

## 2020-07-06 ASSESSMENT — TONOMETRY
IOP_METHOD: APPLANATION
OS_IOP_MMHG: 20
OD_IOP_MMHG: 19

## 2020-07-06 ASSESSMENT — VISUAL ACUITY
OD_SC: 20/60
OD_PH_SC+: -2
OD_PH_SC: 20/25
OS_SC: 20/25
METHOD: SNELLEN - LINEAR
OS_SC+: -2
OD_SC+: +2

## 2020-07-06 ASSESSMENT — CUP TO DISC RATIO
OS_RATIO: 0.2
OD_RATIO: 0.2

## 2020-07-06 ASSESSMENT — CONF VISUAL FIELD
OS_NORMAL: 1
OD_NORMAL: 1
METHOD: COUNTING FINGERS

## 2020-07-06 ASSESSMENT — REFRACTION_MANIFEST
OD_AXIS: 085
OD_SPHERE: -3.00
OS_CYLINDER: +0.75
OD_CYLINDER: +0.75
OS_SPHERE: -2.50
OS_AXIS: 075

## 2020-07-06 ASSESSMENT — EXTERNAL EXAM - LEFT EYE: OS_EXAM: NORMAL

## 2020-07-06 ASSESSMENT — REFRACTION_WEARINGRX
OS_CYLINDER: +0.75
OS_AXIS: 030
OD_SPHERE: -1.00
OD_CYLINDER: SPHERE
OS_SPHERE: -0.75
SPECS_TYPE: MRX

## 2020-07-06 ASSESSMENT — EXTERNAL EXAM - RIGHT EYE: OD_EXAM: NORMAL

## 2020-07-06 ASSESSMENT — SLIT LAMP EXAM - LIDS
COMMENTS: NORMAL
COMMENTS: NORMAL

## 2020-07-06 NOTE — PROGRESS NOTES
HPI  Queenie Rutledge is a 35 year old female with prior hx concerning for glaucoma suspect vs JOAG vs OHT. She was first told about glaucoma diagnosis in 1998 but has hx of non-compliance with drops. During her last visit with Dr. Wilson in 2017, it was decided to monitor without drops. She had lasik in 2010 but is here for increased blurriness. Reports occational headaches, but no nausea, vomiting, temporary vision loss, flashes, floaters    Assessment & Plan      (H40.003) Glaucoma suspect of both eyes  (primary encounter diagnosis)  Comment: Diagnosed in 1998 as OHT vs JOAG vs glaucoma suspect. Previously on Cosopt but discontinued by Dr. Wilson 3 years ago  -Thick Pachy (618/643 in 2017)  - FHx: Grandmother? Unknown paternal hx  - T max: 28/29  Plan:   -OVF 24-2 Dynamic each eye (7/6/20)- ?Nasal step vs poor reliability in right eye, left eye normal  -OCT Optic Nerve RNFL (7/6/20)- stable nerves each eye with no thinning on imaging  - Nerves are symmetrical with c/d of 0.2  - Will monitor off drops  - RTC in 1 year for RNFL OCT, OVF 24-2    (H52.7) Refractive error  (Z98.890) s/p LASIK of both eyes  Comment: Pt interested in another LASIK surgery as her vision has worsened. Discussed risk and benefits  Plan: Glasses prescription given    -----------------------------------------------------------------------------------    Patient disposition:   Return in about 1 year (around 7/6/2021) for RNFL OCT, HVF. or sooner as needed.    Sang Orozco MD  Ophthalmology Resident, PGY-3    Teaching statement:  Complete documentation of historical and exam elements from today's encounter can be found in the full encounter summary report (not reduplicated in this progress note). I personally obtained the chief complaint(s) and history of present illness.  I confirmed and edited as necessary the review of systems, past medical/surgical history, family history, social history, and examination findings as documented by others; and  I examined the patient myself. I personally reviewed the relevant tests, images, and reports as documented above.     I formulated and edited as necessary the assessment and plan and discussed the findings and management plan with the patient and family.    Maryana Plummer MD  Comprehensive Ophthalmology & Ocular Pathology  Department of Ophthalmology and Visual Neurosciences  [email or web address]  Pager [phone]

## 2020-07-06 NOTE — NURSING NOTE
Chief Complaints and History of Present Illnesses   Patient presents with     Glaucoma Suspect Follow Up     3 year follow up both eyes.     Chief Complaint(s) and History of Present Illness(es)     Glaucoma Suspect Follow Up     Laterality: both eyes    Comments: 3 year follow up both eyes.              Comments     Pt states vision appearing blurry in both eyes since allergy season.  No eye pain today. Floaters in both eyes frequently, no changes.  No flashes of light.   Light headaches recently, pt does not use any medication for relief.    Pt does not use any drops.    ARELI Ace July 6, 2020 2:02 PM

## 2020-09-01 ENCOUNTER — TELEPHONE (OUTPATIENT)
Dept: OPHTHALMOLOGY | Facility: CLINIC | Age: 35
End: 2020-09-01

## 2020-09-01 NOTE — TELEPHONE ENCOUNTER
Reviewed no indication per last note could not wear contact lenses--scheduled with Dr. Arellano this Friday     Reviewed 6 or 7 LASIK providers in area pt may reach out to    Pt seemed satisfied with information    Tre Simmons RN 3:23 PM 09/01/20        Queenie Rutledge [phone]    Left message with direct number at [phone]  Tre Simmons RN 9:54 AM 09/01/20         Health Call Center    Phone Message    May a detailed message be left on voicemail: yes     Reason for Call: Other: Pt called in on 8/17 and is following up waiting for a response. Notes she has had Lasik and is now wondering if she can therefore wear contacts instead of glasses. Would like to know if we see Lasik Pts for contact exams. Please call her back - notes she can also be reached via [employer] message. Thank you    Action Taken: Message routed to:  [facility] EYE    Travel Screening: Not Applicable

## 2020-09-16 ENCOUNTER — OFFICE VISIT (OUTPATIENT)
Dept: OPTOMETRY | Facility: CLINIC | Age: 35
End: 2020-09-16
Payer: COMMERCIAL

## 2020-09-16 DIAGNOSIS — H52.13 MYOPIA, BILATERAL: Primary | ICD-10-CM

## 2020-09-16 ASSESSMENT — REFRACTION_WEARINGRX
OD_SPHERE: -3.00
OD_CYLINDER: +0.75
OS_SPHERE: -2.50
SPECS_TYPE: LAST MRX
OS_AXIS: 075
OS_CYLINDER: +0.75
OD_AXIS: 085

## 2020-09-16 ASSESSMENT — REFRACTION_CURRENTRX
OD_DIAMETER: 14.3
OS_BASECURVE: 8.5
OD_SPHERE: -2.25
OS_BRAND: ACUVUE OASYS 1 DAY
OD_BRAND: ACUVUE OASYS 1 DAY
OS_DIAMETER: 14.3
OD_BASECURVE: 8.5
OS_SPHERE: -0.75

## 2020-09-16 ASSESSMENT — REFRACTION_MANIFEST
OD_AXIS: 090
OS_CYLINDER: SPHERE
OD_CYLINDER: +0.75
OD_SPHERE: -2.50
OS_SPHERE: -0.75

## 2020-09-16 ASSESSMENT — VISUAL ACUITY
METHOD: SNELLEN - LINEAR
OS_SC: 20/25
OD_SC: 20/60-2

## 2020-09-16 ASSESSMENT — SLIT LAMP EXAM - LIDS
COMMENTS: NORMAL
COMMENTS: NORMAL

## 2020-09-16 ASSESSMENT — EXTERNAL EXAM - LEFT EYE: OS_EXAM: NORMAL

## 2020-09-16 ASSESSMENT — EXTERNAL EXAM - RIGHT EYE: OD_EXAM: NORMAL

## 2020-09-16 NOTE — PROGRESS NOTES
A/P  1.) Myopia right eye>left eye, s/p LASIK each eye  -Corrects well with myopic Rx. Previous CL wearer  -Good vision/comfort/fit in AV Oasys 1 Day. Pt demo'd I&R in office  -Reviewed CL care and hygiene. Okay to order if working well. Contact the clinic with any CL concerns    Monitor 1-2 years routine

## 2020-10-24 DIAGNOSIS — L73.2 HIDRADENITIS SUPPURATIVA: ICD-10-CM

## 2020-10-27 ENCOUNTER — MYC MEDICAL ADVICE (OUTPATIENT)
Dept: DERMATOLOGY | Facility: CLINIC | Age: 35
End: 2020-10-27

## 2020-10-27 ENCOUNTER — TELEPHONE (OUTPATIENT)
Dept: DERMATOLOGY | Facility: CLINIC | Age: 35
End: 2020-10-27

## 2020-10-27 RX ORDER — CLINDAMYCIN PHOSPHATE 10 UG/ML
LOTION TOPICAL
Qty: 60 ML | Refills: 3 | OUTPATIENT
Start: 2020-10-27

## 2020-10-27 NOTE — TELEPHONE ENCOUNTER
"Spoke with patient and after looking further into her chart, 04/06/18 she saw Dr. Aguillon and the note states, \"Took cephalexin (KEFLEX) 500 mg capsule and finished\". The patient would like this medication again because she has been using the benzoyl peroxide 5 % external liquid and clindamycin (CLEOCIN T) 1 % external lotion for 3 weeks with very small improvement. Informed the patient that I will talk to the [facility] and that she might need to be seen since we have no prescribed this medication in the past. She stated she hopes the next opening isnt weeks away like it usually is.    Da Martínez, [facility]     "

## 2020-10-27 NOTE — TELEPHONE ENCOUNTER
Mickey Stephens MD [place], Kayrene, [facility]   Caller: Unspecified (Today,  3:00 PM)   Phone Number: [phone]             It seems like an inflamed pilonidal cyst. She should be seen asap for consideration of ILK, oral antibiotics (eg doxy), and maybe I&D     Thank you!   If it's too long of a time before an appt, I can do 7 days of doxy

## 2020-10-27 NOTE — TELEPHONE ENCOUNTER
----- Message from Makenna Kerns RN sent at 10/27/2020  6:31 AM CDT -----  Regarding: appointment DERMATOLOGY  Please call patient to schedule Dermatology appointment.  Last visit 11/18/2019 [facility].  Plan RTC 6 months.  #recall list dated 5/18/2020.  Appointment needed for medication refills.     Thank you, Makenna SCHERER RN Med Refill Team    I do not need any follow up on the scheduling of this appointment unless the patient will no longer be receiving care in our clinic.

## 2020-10-27 NOTE — TELEPHONE ENCOUNTER
Refill refused.  Patient needs to make appointment. Pharmacy called- voicemail message left.  Message was sent Derm scheduling.

## 2020-10-28 ENCOUNTER — OFFICE VISIT (OUTPATIENT)
Dept: DERMATOLOGY | Facility: CLINIC | Age: 35
End: 2020-10-28
Payer: COMMERCIAL

## 2020-10-28 DIAGNOSIS — L73.2 HIDRADENITIS SUPPURATIVA: ICD-10-CM

## 2020-10-28 PROCEDURE — 11900 INJECT SKIN LESIONS </W 7: CPT | Performed by: PHYSICIAN ASSISTANT

## 2020-10-28 PROCEDURE — 99212 OFFICE O/P EST SF 10 MIN: CPT | Mod: 25 | Performed by: PHYSICIAN ASSISTANT

## 2020-10-28 RX ORDER — CLINDAMYCIN PHOSPHATE 10 UG/ML
LOTION TOPICAL
Qty: 60 ML | Refills: 3 | Status: SHIPPED | OUTPATIENT
Start: 2020-10-28 | End: 2022-04-28

## 2020-10-28 ASSESSMENT — PAIN SCALES - GENERAL
PAINLEVEL: NO PAIN (0)
PAINLEVEL: MILD PAIN (2)

## 2020-10-28 NOTE — LETTER
"10/28/2020       RE: Queenie Rutledge  [address] [facility] [address]     Dear Colleague,    Thank you for referring your patient, Queenie Rutledge, to the [facility]ERMATOLOGY [facility] [place] at [facility]. Please see a copy of my visit note below.    Drug Administration Record    Prior to injection, verified patient identity using patient's name and date of birth.  Due to injection administration, patient instructed to remain in clinic for 15 minutes  afterwards, and to report any adverse reaction to me immediately.    Drug Name: triamcinolone acetonide(kenalog)  Dose: 0.25mL of triamcinolone 10mg/mL, 9.75mg dose  Route administered: ID  NDC #: Kenalog-10 (1650-4015-95)  Amount of waste(mL): 4.75mL  Reason for waste: Multi dose vial    LOT #: [id number]  SITE: See provider note   : [employer]  EXPIRATION DATE: 03/22      [facility] Dermatology Note      Dermatology Problem List:  1. Mild hidradenitis suppurativa vs recurrent furunculosis - axilla, groin. Favor HS due to history of pilonidal cyst.  - 10/28/20 ILK   - Current tx: Hibiclens 4% wash   daily, Clindamycin 1% BID when flares, birth control  S/p BPO 5% wash, hx of I&D   - Future considerations: Spironolactone    Encounter Date: Oct 28, 2020    CC:  Chief Complaint   Patient presents with     Derm Problem     Queenie is here today for an HS follow up.          History of Present Illness:  Ms. Queenie Rutledge is a 35 year old female who presents as a follow-up for hidradenitis suppurativa. The patient was last seen 11/18/19 when the patient was started on Hibiclens 5% wash daily and to stop BPO 5% wash. She was recommended to continue Clindamycin 1% BID when flares and recommended continuing her birth control. She states her skin was doing really well until approximately one month ago. She started getting a \"knot\" in her upper gluteal cleft. She soaked in her bath, " took ibuprofen and applied clindamycin lotion twice daily. She states within the last day or two the bump on her buttocks is starting to resolve. She also has another spot on her left side of her groin that is going away as well. She has a new spot within the last week on the right side of her groin. It is painful to touch.     She wonders about treatment options and if there is anything preventative for HS. She admits she has been sitting a lot lately for work. She has gained weight as well. She is feeling well, without other skin concerns.     Past Medical History:   Patient Active Problem List   Diagnosis     Glaucoma suspect     Glaucoma suspect, bilateral     Past Medical History:   Diagnosis Date     Glaucoma      Past Surgical History:   Procedure Laterality Date     LASIK BILATERAL Bilateral 03/02/10       Social History:  Patient reports that she has never smoked. She has never used smokeless tobacco. 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 She is single, no children, and lives in [place].     Family History:  Family History   Problem Relation Age of Onset     Diabetes No family hx of      Glaucoma No family hx of      Macular Degeneration No family hx of      Amblyopia No family hx of      Retinal detachment No family hx of      Thyroid Disease No family hx of      Melanoma No family hx of      Skin Cancer No family hx of        Medications:  Current Outpatient Medications   Medication Sig Dispense Refill     benzoyl peroxide 5 % external liquid Use daily as a body wash as directed. 226 g 11     chlorhexidine (HIBICLENS) 4 % liquid Apply topically daily 473 mL 5     clindamycin (CLEOCIN T) 1 % external lotion Apply twice daily to any inflamed bumps on the skin as needed. 60 mL 3     ketotifen (ZADITOR) 0.025 % ophthalmic solution Place 1 drop into both eyes as needed       MedroxyPROGESTERone Acetate (DEPO-PROVERA IM)        Allergies   Allergen Reactions      Amoxicillin          Review of Systems:  -As per HPI  -Constitutional: Otherwise feeling well today, in usual state of health.  -HEENT: Patient denies nonhealing oral sores.  -Skin: As above in HPI. No additional skin concerns.    Physical exam:  Vitals: There were no vitals taken for this visit.  GEN: This is a well developed, well-nourished female in no acute distress, in a pleasant mood.    SKIN: Focused examination of the thighs, vulva, perineum and buttocks was performed.  -Forman skin type: VI  - There are 1 cm non tender thin nodule on the left upper gluteal cleft and left perineum.  -There is a tender 2 cm nodule in the right inguinal fold.  -few scattered comedones in the groin  -No other lesions of concern on areas examined.     Impression/Plan:  1. Hidradenitis suppurativa vs less likely recurrent furunculosis. Hidradenitis suppurativa is favored due to patient presentation and history of pilonidal cyst at age 18. Active flare today and within the last month. Discussed treatment options. Pt would like to pursuit ILK today to one tender area, remaining two areas are resolving.      Continue Depo-Provera    Continue chlorhexadine 4% wash     Continue topical clindamycin 1% lotion 2x daily when inflamed lesions    ILK prn, 1st time today    Spironolactone discussed as option for future consideration.    Kenalog intralesional injection procedure note (performed by faculty): After verbal consent and discussion of risks including but not limited to atrophy, pain, and bruising,  time out was performed, 0.25 total cc of Kenalog 10 mg/cc was injected into one sites on the right inguinal fold.  The patient tolerated the procedure well and left the Dermatology clinic in good condition.    Follow-up in 1 months, earlier for new or changing lesions.     Staff Involved:    All risks, benefits and alternatives were discussed with patient.  Patient is in agreement and understands the assessment and plan.  All  questions were answered.  Sun Screen Education was given.   Return to Clinic in 1 month or sooner as needed.   Renee Cho PA-C

## 2020-10-28 NOTE — PROGRESS NOTES
Drug Administration Record    Prior to injection, verified patient identity using patient's name and date of birth.  Due to injection administration, patient instructed to remain in clinic for 15 minutes  afterwards, and to report any adverse reaction to me immediately.    Drug Name: triamcinolone acetonide(kenalog)  Dose: 0.25mL of triamcinolone 10mg/mL, 9.75mg dose  Route administered: ID  NDC #: Kenalog-10 ([id number])  Amount of waste(mL): 4.75mL  Reason for waste: Multi dose vial    LOT #: [id number]  SITE: See provider note   : [employer]  EXPIRATION DATE: 03/22

## 2020-10-28 NOTE — NURSING NOTE
Dermatology Rooming Note    Queenie Rutledge's goals for this visit include:   Chief Complaint   Patient presents with     Derm Problem     Queenie is here today for an HS follow up.      MARCEL Lopes

## 2020-10-29 NOTE — PROGRESS NOTES
"[facility] Dermatology Note      Dermatology Problem List:  1. Mild hidradenitis suppurativa vs recurrent furunculosis - axilla, groin. Favor HS due to history of pilonidal cyst.  - 10/28/20 ILK   - Current tx: Hibiclens 4% wash   daily, Clindamycin 1% BID when flares, birth control  S/p BPO 5% wash, hx of I&D   - Future considerations: Spironolactone    Encounter Date: Oct 28, 2020    CC:  Chief Complaint   Patient presents with     Derm Problem     Queenie is here today for an HS follow up.          History of Present Illness:  Ms. Queenie Rutledge is a 35 year old female who presents as a follow-up for hidradenitis suppurativa. The patient was last seen 11/18/19 when the patient was started on Hibiclens 5% wash daily and to stop BPO 5% wash. She was recommended to continue Clindamycin 1% BID when flares and recommended continuing her birth control. She states her skin was doing really well until approximately one month ago. She started getting a \"knot\" in her upper gluteal cleft. She soaked in her bath, took ibuprofen and applied clindamycin lotion twice daily. She states within the last day or two the bump on her buttocks is starting to resolve. She also has another spot on her left side of her groin that is going away as well. She has a new spot within the last week on the right side of her groin. It is painful to touch.     She wonders about treatment options and if there is anything preventative for HS. She admits she has been sitting a lot lately for work. She has gained weight as well. She is feeling well, without other skin concerns.     Past Medical History:   Patient Active Problem List   Diagnosis     Glaucoma suspect     Glaucoma suspect, bilateral     Past Medical History:   Diagnosis Date     Glaucoma      Past Surgical History:   Procedure Laterality Date     LASIK BILATERAL Bilateral 03/02/10       Social History:  Patient reports that she has never smoked. She has never used " smokeless tobacco. 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     Family History:  Family History   Problem Relation Age of Onset     Diabetes No family hx of      Glaucoma No family hx of      Macular Degeneration No family hx of      Amblyopia No family hx of      Retinal detachment No family hx of      Thyroid Disease No family hx of      Melanoma No family hx of      Skin Cancer No family hx of        Medications:  Current Outpatient Medications   Medication Sig Dispense Refill     benzoyl peroxide 5 % external liquid Use daily as a body wash as directed. 226 g 11     chlorhexidine (HIBICLENS) 4 % liquid Apply topically daily 473 mL 5     clindamycin (CLEOCIN T) 1 % external lotion Apply twice daily to any inflamed bumps on the skin as needed. 60 mL 3     ketotifen (ZADITOR) 0.025 % ophthalmic solution Place 1 drop into both eyes as needed       MedroxyPROGESTERone Acetate (DEPO-PROVERA IM)        Allergies   Allergen Reactions     Amoxicillin          Review of Systems:  -As per HPI  -Constitutional: Otherwise feeling well today, in usual state of health.  -HEENT: Patient denies nonhealing oral sores.  -Skin: As above in HPI. No additional skin concerns.    Physical exam:  Vitals: There were no vitals taken for this visit.  GEN: This is a well developed, well-nourished female in no acute distress, in a pleasant mood.    SKIN: Focused examination of the thighs, vulva, perineum and buttocks was performed.  -Forman skin type: VI  - There are 1 cm non tender thin nodule on the left upper gluteal cleft and left perineum.  -There is a tender 2 cm nodule in the right inguinal fold.  -few scattered comedones in the groin  -No other lesions of concern on areas examined.     Impression/Plan:  1. Hidradenitis suppurativa vs less likely recurrent furunculosis. Hidradenitis suppurativa is favored due to patient presentation and  history of pilonidal cyst at age 18. Active flare today and within the last month. Discussed treatment options. Pt would like to pursuit ILK today to one tender area, remaining two areas are resolving.      Continue Depo-Provera    Continue chlorhexadine 4% wash     Continue topical clindamycin 1% lotion 2x daily when inflamed lesions    ILK prn, 1st time today    Spironolactone discussed as option for future consideration.    Kenalog intralesional injection procedure note (performed by faculty): After verbal consent and discussion of risks including but not limited to atrophy, pain, and bruising,  time out was performed, 0.25 total cc of Kenalog 10 mg/cc was injected into one sites on the right inguinal fold.  The patient tolerated the procedure well and left the Dermatology clinic in good condition.    Follow-up in 1 months, earlier for new or changing lesions.     Staff Involved:    All risks, benefits and alternatives were discussed with patient.  Patient is in agreement and understands the assessment and plan.  All questions were answered.  Sun Screen Education was given.   Return to Clinic in 1 month or sooner as needed.   Renee Cho PA-C

## 2020-11-16 ENCOUNTER — TELEPHONE (OUTPATIENT)
Dept: DERMATOLOGY | Facility: CLINIC | Age: 35
End: 2020-11-16

## 2020-11-16 ENCOUNTER — OFFICE VISIT (OUTPATIENT)
Dept: DERMATOLOGY | Facility: CLINIC | Age: 35
End: 2020-11-16
Payer: COMMERCIAL

## 2020-11-16 DIAGNOSIS — L73.2 HIDRADENITIS SUPPURATIVA: Primary | ICD-10-CM

## 2020-11-16 PROCEDURE — 99214 OFFICE O/P EST MOD 30 MIN: CPT | Performed by: PHYSICIAN ASSISTANT

## 2020-11-16 RX ORDER — DOXYCYCLINE 100 MG/1
100 CAPSULE ORAL 2 TIMES DAILY
Qty: 20 CAPSULE | Refills: 0 | Status: SHIPPED | OUTPATIENT
Start: 2020-11-16 | End: 2020-12-22

## 2020-11-16 ASSESSMENT — PAIN SCALES - GENERAL: PAINLEVEL: MILD PAIN (2)

## 2020-11-16 NOTE — TELEPHONE ENCOUNTER
[facility] Call Center    Phone Message    May a detailed message be left on voicemail: yes     Reason for Call: Symptoms or Concerns     If patient has red-flag symptoms, warm transfer to triage line    Current symptom or concern: Pt was treated for an inflamed cyst on 10/28 but after treatment the cyst is now more painful and has turned purple.  Pt believes she should be seen today or tomorrow for Follow-up treatment.  Please call Pt to offer any opening.    Symptoms have been present for:  2 week(s)    Has patient previously been seen for this? Yes    By Tamar Cho PA-C    Date: 10/25/2020    Are there any new or worsening symptoms? Yes: More painful and purple discoloration of cuyst      Action Taken: Message routed to:  [facility] dermatology    Travel Screening: Not Applicable

## 2020-11-16 NOTE — PROGRESS NOTES
[facility] Dermatology Note    Dermatology Problem List:  1. Mild hidradenitis suppurativa vs recurrent furunculosis - axilla, groin. Favor HS due to history of pilonidal cyst.  - 10/28/20 ILK to R groin  - Current tx: Hibiclens 4% wash daily, Clindamycin 1% BID when flares, birth control (depo inj), doxycycline 100mg po BID x10 days  S/p BPO 5% wash, hx of I&D   - Future considerations: Spironolactone    Encounter Date: Nov 16, 2020    CC:  Chief Complaint   Patient presents with     Derm Problem     HS flare in groin. Wants to make sure it is not infected.     History of Present Illness:  Ms. Queenie Rutledge is a 35 year old female who presents as a follow-up for hidradenitis suppurativa. The patient was last seen 10/28/20 for ILK to an inflamed lesion on the R groin.She states the injection helped a lot, it was gone for a few days. She then feels like the same spot got inflamed again. She is wondering if it is infected or if there is an ingrown hair present. Last visit was the first time she has had injections. She notes increased stress at work which may be contributory. She feels like she can't walk, and if this goes on any longer it may make her miss work.  She is otherwise well and has no other concerns.     Past Medical History:   Patient Active Problem List   Diagnosis     Glaucoma suspect     Glaucoma suspect, bilateral     Past Medical History:   Diagnosis Date     Glaucoma      Past Surgical History:   Procedure Laterality Date     LASIK BILATERAL Bilateral 03/02/10       Social History:  Patient reports that she has never smoked. She has never used smokeless tobacco. 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     Family History:  Family History   Problem Relation Age of Onset     Diabetes No family hx of      Glaucoma No family hx of      Macular Degeneration No family hx of      Amblyopia No  family hx of      Retinal detachment No family hx of      Thyroid Disease No family hx of      Melanoma No family hx of      Skin Cancer No family hx of        Medications:  Current Outpatient Medications   Medication Sig Dispense Refill     benzoyl peroxide 5 % external liquid Use daily as a body wash as directed. 226 g 11     chlorhexidine (HIBICLENS) 4 % liquid Apply topically daily 473 mL 5     clindamycin (CLEOCIN T) 1 % external lotion Apply twice daily to any inflamed bumps on the skin as needed. 60 mL 3     ketotifen (ZADITOR) 0.025 % ophthalmic solution Place 1 drop into both eyes as needed       MedroxyPROGESTERone Acetate (DEPO-PROVERA IM)        Allergies   Allergen Reactions     Amoxicillin          Review of Systems:  -As per HPI  -Constitutional: Otherwise feeling well today, in usual state of health.  -HEENT: Patient denies nonhealing oral sores.  -Skin: As above in HPI. No additional skin concerns.  -GI: no nausea, abdominal pain, vomiting, diarrhea or blood in the stool    Physical exam:  Vitals: There were no vitals taken for this visit.  GEN: This is a well developed, well-nourished female in no acute distress, in a pleasant mood.    SKIN: Focused examination of the thighs, vulva, perineum and buttocks was performed.  -Forman skin type: VI  - There is a 1cm fluctuant, acutely tender nodule on the L perineium  -There is a tender 2 cm nodule in the right inguinal fold.  -few scattered comedones in the groin  -No other lesions of concern on areas examined.     Impression/Plan:  1. Hidradenitis suppurativa vs less likely recurrent furunculosis. Hidradenitis suppurativa is favored due to patient presentation and history of pilonidal cyst at age 18. Active flare today and within the last month. Discussed treatment options. Pt would like to pursuit ILK today to one tender area, remaining two areas are resolving.      Continue Depo-Provera    Continue chlorhexadine 4% wash     Continue topical  clindamycin 1% lotion 2x daily when inflamed lesions    ILK prn, 1st time last visit, will avoid today as last ILK was only 3 weeks prior    Spironolactone discussed as option for future consideration.    Start doxycycline 100mg po BID x 10 days    Follow-up in 1 month, earlier for new or changing lesions.   CC Dr. Tsang on close of this encounter.    Staff Involved:    All risks, benefits and alternatives were discussed with patient.  Patient is in agreement and understands the assessment and plan.  All questions were answered.    Pam Jacinto PA-C, MPAS  [facility] [place]: Phone: [phone], Fax: [phone]  [facility]: Phone: [phone],  Fax: [phone]

## 2020-11-16 NOTE — LETTER
11/16/2020       RE: Queenie Rutledge  [address] [facility] [address]     Dear Colleague,    Thank you for referring your patient, Queenie Rutledge, to the [facility] DERMATOLOGY CLINIC [place] [facility]. Please see a copy of my visit note below.    [facility] Dermatology Note    Dermatology Problem List:  1. Mild hidradenitis suppurativa vs recurrent furunculosis - axilla, groin. Favor HS due to history of pilonidal cyst.  - 10/28/20 ILK to R groin  - Current tx: Hibiclens 4% wash daily, Clindamycin 1% BID when flares, birth control (depo inj), doxycycline 100mg po BID x10 days  S/p BPO 5% wash, hx of I&D   - Future considerations: Spironolactone    Encounter Date: Nov 16, 2020    CC:  Chief Complaint   Patient presents with     Derm Problem     HS flare in groin. Wants to make sure it is not infected.     History of Present Illness:  Ms. Queenie Rutledge is a 35 year old female who presents as a follow-up for hidradenitis suppurativa. The patient was last seen 10/28/20 for ILK to an inflamed lesion on the R groin.She states the injection helped a lot, it was gone for a few days. She then feels like the same spot got inflamed again. She is wondering if it is infected or if there is an ingrown hair present. Last visit was the first time she has had injections. She notes increased stress at work which may be contributory. She feels like she can't walk, and if this goes on any longer it may make her miss work.  She is otherwise well and has no other concerns.     Past Medical History:   Patient Active Problem List   Diagnosis     Glaucoma suspect     Glaucoma suspect, bilateral     Past Medical History:   Diagnosis Date     Glaucoma      Past Surgical History:   Procedure Laterality Date     LASIK BILATERAL Bilateral 03/02/10       Social History:  Patient reports that she has never smoked. She has never used smokeless tobacco. 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 She is single, no children, and lives in [facility] [place].     Family History:  Family History   Problem Relation Age of Onset     Diabetes No family hx of      Glaucoma No family hx of      Macular Degeneration No family hx of      Amblyopia No family hx of      Retinal detachment No family hx of      Thyroid Disease No family hx of      Melanoma No family hx of      Skin Cancer No family hx of        Medications:  Current Outpatient Medications   Medication Sig Dispense Refill     benzoyl peroxide 5 % external liquid Use daily as a body wash as directed. 226 g 11     chlorhexidine (HIBICLENS) 4 % liquid Apply topically daily 473 mL 5     clindamycin (CLEOCIN T) 1 % external lotion Apply twice daily to any inflamed bumps on the skin as needed. 60 mL 3     ketotifen (ZADITOR) 0.025 % ophthalmic solution Place 1 drop into both eyes as needed       MedroxyPROGESTERone Acetate (DEPO-PROVERA IM)        Allergies   Allergen Reactions     Amoxicillin          Review of Systems:  -As per HPI  -Constitutional: Otherwise feeling well today, in usual state of health.  -HEENT: Patient denies nonhealing oral sores.  -Skin: As above in HPI. No additional skin concerns.  -GI: no nausea, abdominal pain, vomiting, diarrhea or blood in the stool    Physical exam:  Vitals: There were no vitals taken for this visit.  GEN: This is a well developed, well-nourished female in no acute distress, in a pleasant mood.    SKIN: Focused examination of the thighs, vulva, perineum and buttocks was performed.  -Forman skin type: VI  - There is a 1cm fluctuant, acutely tender nodule on the L perineium  -There is a tender 2 cm nodule in the right inguinal fold.  -few scattered comedones in the groin  -No other lesions of concern on areas examined.     Impression/Plan:  1. Hidradenitis suppurativa vs less likely recurrent furunculosis. Hidradenitis suppurativa is favored due to patient  presentation and history of pilonidal cyst at age 18. Active flare today and within the last month. Discussed treatment options. Pt would like to pursuit ILK today to one tender area, remaining two areas are resolving.      Continue Depo-Provera    Continue chlorhexadine 4% wash     Continue topical clindamycin 1% lotion 2x daily when inflamed lesions    ILK prn, 1st time last visit, will avoid today as last ILK was only 3 weeks prior    Spironolactone discussed as option for future consideration.    Start doxycycline 100mg po BID x 10 days    Follow-up in 1 month, earlier for new or changing lesions.   CC Dr. Tsang on close of this encounter.    Staff Involved:    All risks, benefits and alternatives were discussed with patient.  Patient is in agreement and understands the assessment and plan.  All questions were answered.    Pam Jacinto PA-C, MPAS  [facility] [place]: Phone: [phone], Fax: [phone]  [facility]: Phone: [phone],  Fax: [phone]

## 2020-11-16 NOTE — NURSING NOTE
Dermatology Rooming Note    Queenie Rutledge's goals for this visit include:   Chief Complaint   Patient presents with     Derm Problem     HS flare in groin. Wants to make sure it is not infected.     Vivian Turner, CMA

## 2020-11-18 ENCOUNTER — TELEPHONE (OUTPATIENT)
Dept: DERMATOLOGY | Facility: CLINIC | Age: 35
End: 2020-11-18

## 2020-11-18 NOTE — TELEPHONE ENCOUNTER
Queenie called via triage line in regards to a draining hs spot with questions about how to care for the area. I spoke with Dr. Cartwright and she advised that Queenie should continue using the Chlorhexadine around the area and also apply the Clindamycin lotion to the area. Information relayed the patient.    MARCEL Castro

## 2020-12-18 ENCOUNTER — MYC MEDICAL ADVICE (OUTPATIENT)
Dept: OPHTHALMOLOGY | Facility: CLINIC | Age: 35
End: 2020-12-18

## 2020-12-20 ENCOUNTER — HEALTH MAINTENANCE LETTER (OUTPATIENT)
Age: 35
End: 2020-12-20

## 2020-12-21 ENCOUNTER — MYC MEDICAL ADVICE (OUTPATIENT)
Dept: DERMATOLOGY | Facility: CLINIC | Age: 35
End: 2020-12-21

## 2020-12-21 DIAGNOSIS — L73.2 HIDRADENITIS SUPPURATIVA: ICD-10-CM

## 2020-12-22 RX ORDER — DOXYCYCLINE 100 MG/1
100 CAPSULE ORAL 2 TIMES DAILY
Qty: 20 CAPSULE | Refills: 0 | Status: SHIPPED | OUTPATIENT
Start: 2020-12-22 | End: 2021-08-24

## 2020-12-28 ENCOUNTER — ALLIED HEALTH/NURSE VISIT (OUTPATIENT)
Dept: OPHTHALMOLOGY | Facility: CLINIC | Age: 35
End: 2020-12-28
Attending: OPHTHALMOLOGY
Payer: COMMERCIAL

## 2020-12-28 DIAGNOSIS — H52.13 MYOPIC ASTIGMATISM OF BOTH EYES: Primary | ICD-10-CM

## 2020-12-28 DIAGNOSIS — H52.203 MYOPIC ASTIGMATISM OF BOTH EYES: Primary | ICD-10-CM

## 2020-12-28 PROCEDURE — 999N000103 HC STATISTIC NO CHARGE FACILITY FEE

## 2020-12-28 PROCEDURE — 99207 PR NO CHARGE LOS: CPT

## 2020-12-28 ASSESSMENT — VISUAL ACUITY
OD_CC: 20/20
OS_CC: 20/25 SLOW
CORRECTION_TYPE: GLASSES
METHOD: SNELLEN - LINEAR

## 2020-12-28 ASSESSMENT — REFRACTION_MANIFEST
OS_SPHERE: -1.25
OS_CYLINDER: SPHERE
OD_CYLINDER: SPHERE
OD_SPHERE: -2.00

## 2021-01-05 ENCOUNTER — VIRTUAL VISIT (OUTPATIENT)
Dept: FAMILY MEDICINE | Facility: OTHER | Age: 36
End: 2021-01-05
Payer: COMMERCIAL

## 2021-01-05 PROCEDURE — 99421 OL DIG E/M SVC 5-10 MIN: CPT | Performed by: NURSE PRACTITIONER

## 2021-01-05 NOTE — PROGRESS NOTES
"Date: 2021 06:31:56  Clinician: Beverly Posada  Clinician NPI: [id number]  Patient: Queenie Rutledge  Patient : 1985  Patient Address: [address] [place] [address]  Patient Phone: [phone]  Visit Protocol: Yeast infection  Patient Summary:  Queenie is a 35 year old ( : 1985 ) female who initiated a [facility] Visit for a presumed vaginal yeast infection. When asked the question \"Please sign me up to receive news, health information and promotions from [facility].\", Queenie responded \"No\".    Queenie began noticing vaginal pruritus and vaginal irritation 1-3 days ago.   She denies having blisters, vaginal burning, open sores, vaginal discharge, and abdominal pain. She also denies feeling feverish.   She has not tried to treat her current symptoms with any medication.   Precipitating events  Queenie denies having a sexually transmitted disease.   Pertinent medical history  Queenie has a history of vaginal yeast infections. She has had zero (0) occurrences in the past year and the current symptoms are similar to previous yeast infections. She has used fluconazole (Diflucan) to treat previous yeast infections. 2 doses of fluconazole (Diflucan) has typically been needed for symptoms to resolve in the past.  She prefers a pill. She is currently taking or has taken antibiotics in the past 2 weeks.   Queenie has not had bacterial vaginosis in the past.   Queenie does not have diabetes.   Queenie denies having immunosuppressive conditions (e.g., chemotherapy, HIV, organ transplant, long-term use of steroids or other immunosuppressive medications, splenectomy).   She does NOT smoke or use smokeless tobacco.   She denies pregnancy and denies breastfeeding. She does not menstruate.   Additional information as reported by the patient (free text): I was on doxycycline 2 times in the last month for a skin infection from my dermatologist. I am sure that I have a yeast infection as a result of that " treatment. I has happened in the past.      MEDICATIONS: Depo-Provera intramuscular, ALLERGIES: amoxicillin  Clinician Response:  Dear Queenie,  Based on the information you have provided, you likely have a vaginal yeast infection which is a common infection of the vagina caused by a fungus. Yeast are a type of fungus.  The most common symptom of a yeast infection is itching in and around the vagina. Other signs and symptoms include burning, redness, or a thick, white vaginal discharge that looks like cottage cheese and does not have a bad smell.  Medication information  I am prescribing:     Fluconazole (Diflucan) 150 mg oral tablet. Take 1 tablet by mouth in a single dose. Repeat dose in 3 days if symptoms are still present. There are no refills with this prescription.   Self care  Steps you can take to prevent symptoms of future vaginal yeast infection:     Avoid irritants such as scented bath products, tampons, pads, or vaginal sprays and powders    Avoid douching    Wear cotton underwear and if you are comfortable doing so, do not wear underwear to bed    Avoid hot tubs and whirlpool spas     When to seek care  Most women notice improvement in their symptoms within 1-2 days after starting treatment with complete clearing in 5-7 days.  Please make an appointment to be seen in a clinic or urgent care if any of the following occur:     Your symptoms have not improved in 3 days or not resolved in 10 days    You develop new symptoms or your symptoms become worse    If you think you may be at risk for an STD      Diagnosis: Candida vulvovaginitis  Diagnosis ICD: B37.3  Prescription: fluconazole (Diflucan) 150 mg oral tablet 2 tablet, 4 days supply. Take 1 tablet by mouth in a single dose, repeat dose in 3 days if symptoms are still present. Refills: 0, Refill as needed: no, Allow substitutions: yes  [facility] - [phone] - [address] [address]

## 2021-01-06 DIAGNOSIS — B37.31 VAGINAL CANDIDIASIS: Primary | ICD-10-CM

## 2021-01-06 RX ORDER — FLUCONAZOLE 150 MG/1
150 TABLET ORAL ONCE
Qty: 1 TABLET | Refills: 0 | Status: SHIPPED | OUTPATIENT
Start: 2021-01-06 | End: 2023-08-03

## 2021-02-15 ENCOUNTER — OFFICE VISIT (OUTPATIENT)
Dept: DERMATOLOGY | Facility: CLINIC | Age: 36
End: 2021-02-15
Payer: COMMERCIAL

## 2021-02-15 DIAGNOSIS — L73.2 HIDRADENITIS SUPPURATIVA: Primary | ICD-10-CM

## 2021-02-15 DIAGNOSIS — L30.9 DERMATITIS: ICD-10-CM

## 2021-02-15 PROCEDURE — 99213 OFFICE O/P EST LOW 20 MIN: CPT | Mod: 25 | Performed by: PHYSICIAN ASSISTANT

## 2021-02-15 PROCEDURE — 11900 INJECT SKIN LESIONS </W 7: CPT | Performed by: PHYSICIAN ASSISTANT

## 2021-02-15 RX ORDER — DESONIDE 0.5 MG/G
CREAM TOPICAL 2 TIMES DAILY
Qty: 60 G | Refills: 1 | Status: SHIPPED | OUTPATIENT
Start: 2021-02-15 | End: 2022-01-11

## 2021-02-15 ASSESSMENT — PAIN SCALES - GENERAL: PAINLEVEL: NO PAIN (0)

## 2021-02-15 NOTE — PROGRESS NOTES
Drug Administration Record    Prior to injection, verified patient identity using patient's name and date of birth.  Due to injection administration, patient instructed to remain in clinic for 15 minutes  afterwards, and to report any adverse reaction to me immediately.    Drug Name: triamcinolone acetonide(kenalog)  Dose: 0.8mL of triamcinolone 10mg/mL, 8mg dose  Route administered: ID  NDC #: Kenalog-10 (4253-5436-13)  Amount of waste(mL):4.2  Reason for waste: Multi dose vial    LOT #: [id number]  SITE: see note  : [employer]  EXPIRATION DATE: 4/2022

## 2021-02-15 NOTE — NURSING NOTE
Dermatology Rooming Note    Queenie Rutledge's goals for this visit include:   Chief Complaint   Patient presents with     Derm Problem     HS - groin, flare.     Vivian Turner, CMA

## 2021-02-15 NOTE — PROGRESS NOTES
[facility] Dermatology Note  Encounter Date: Feb 15, 2021  Office Visit      Dermatology Problem List:  1. Mild hidradenitis suppurativa vs recurrent furunculosis - axilla, groin. Favor HS due to history of pilonidal cyst.  - 10/28/20 ILK to R groin  - Current tx: Hibiclens 4% wash daily, Clindamycin 1% BID when flares, birth control (depo inj), doxycycline 100mg po BID x10 days with flares  -S/p BPO 5% wash, hx of I&D   - Future considerations: Spironolactone, Humira, isotretinoin  2. Dermatitis - bilateral areolae - desonide 0.05% cream    Social: would like liposuction, but unable to schedule due to ongoing open lesions 2/2 HS  ____________________________________________    Assessment & Plan:  # Eczematous dermatitis - bilateral areola.   -start desonide 0.05% cream once or twice daily x2 weeks and then again if flares  -edu on steroid atrophy  -continue to moisturize skin     # HS - Lopez stage I.   - IL-K injections performed today (see procedure note(s) below).  - restart hibiclens wash 2-3x weekly for prevention  -future considerations: spironolactone, Humira, isotretinoin    Procedures Performed:   - Intra-lesional triamcinolone procedure note. After positioning and cleansing with isopropyl alcohol, 0.7 total mL of triamcinolone 10 mg/mL was injected into 1 lesion(s) on the R groin. The patient tolerated the procedure well and left the dermatology clinic in good condition.     Follow-up: prn for new or changing lesions    Staff:     All risks, benefits and alternatives were discussed with patient.  Patient is in agreement and understands the assessment and plan.  All questions were answered.    Pam Jacinto PA-C, MPAS  [facility] [place]: Phone: [phone], Fax: [phone]  [facility]: Phone: [phone],  Fax: [phone]  ____________________________________________    : Derm Problem (HS -  groin, flare.)      HPI:  Ms. Queenie Rutledge is a 36 year old female who presents today as a return patient for HS flare and itching on the nipples.    Notes same spot in R groin is continuing to flare. S/p two rounds antibiotics and ILK in October 2020. Since that time she has kept vaseline and guaze on it. The area has continued to be open and really has never completely healed - although much improved from baseline. She has not really been using the clindamycin or hibiclens - she has not able to get it from the pharmacy she says. Hx of pilonidal disease in the past. Mild acne, occasionally acneiform papules on scalp with dandruff she reports. Has had nodules appear in the groin and underarms as well once on the chest and once on the back, but they have never opened up and drained, they have just been isolated incidents.     Reports she would like to have liposuction, but cannot due to an ongoing nonhealing lesion on the groin. On depo provera inj, unable to correlate HS flares with menstrual cycle.     Patient is otherwise feeling well, without additional concerns.    Labs:  none    Physical Exam:  Vitals: There were no vitals taken for this visit.  SKIN: Focused examination of head and neck as well as groin and legs was performed.   - R groin - 2cm nodular lesion, no draining tracts or open areas. Mildly tender with palpation.  - HS provider assessment completed   - No other lesions of concern on areas examined.     Medications:  Current Outpatient Medications   Medication     benzoyl peroxide 5 % external liquid     chlorhexidine (HIBICLENS) 4 % liquid     clindamycin (CLEOCIN T) 1 % external lotion     doxycycline monohydrate (MONODOX) 100 MG capsule     ketotifen (ZADITOR) 0.025 % ophthalmic solution     MedroxyPROGESTERone Acetate (DEPO-PROVERA IM)     No current facility-administered medications for this visit.       Past Medical/Surgical History:   Patient Active Problem List   Diagnosis     Glaucoma  suspect     Glaucoma suspect, bilateral     Past Medical History:   Diagnosis Date     Glaucoma        CC Dr. Cartwright on close of this encounter.

## 2021-02-15 NOTE — LETTER
2/15/2021       RE: Queenie Rutledge  [address] [facility] [address]     Dear Colleague,    Thank you for referring your patient, Queenie Rutledge, to the [facility] DERMATOLOGY CLINIC [place] at [facility]. Please see a copy of my visit note below.    [facility] Dermatology Note  Encounter Date: Feb 15, 2021  Office Visit      Dermatology Problem List:  1. Mild hidradenitis suppurativa vs recurrent furunculosis - axilla, groin. Favor HS due to history of pilonidal cyst.  - 10/28/20 ILK to R groin  - Current tx: Hibiclens 4% wash daily, Clindamycin 1% BID when flares, birth control (depo inj), doxycycline 100mg po BID x10 days with flares  -S/p BPO 5% wash, hx of I&D   - Future considerations: Spironolactone, Humira, isotretinoin  2. Dermatitis - bilateral areolae - desonide 0.05% cream    Social: would like liposuction, but unable to schedule due to ongoing open lesions 2/2 HS  ____________________________________________    Assessment & Plan:  # Eczematous dermatitis - bilateral areola.   -start desonide 0.05% cream once or twice daily x2 weeks and then again if flares  -edu on steroid atrophy  -continue to moisturize skin     # HS - Lopez stage I.   - IL-K injections performed today (see procedure note(s) below).  - restart hibiclens wash 2-3x weekly for prevention  -future considerations: spironolactone, Humira, isotretinoin    Procedures Performed:   - Intra-lesional triamcinolone procedure note. After positioning and cleansing with isopropyl alcohol, 0.7 total mL of triamcinolone 10 mg/mL was injected into 1 lesion(s) on the R groin. The patient tolerated the procedure well and left the dermatology clinic in good condition.     Follow-up: prn for new or changing lesions    Staff:     All risks, benefits and alternatives were discussed with patient.  Patient is in agreement and understands the assessment and plan.  All  questions were answered.    Pam Jacinto PA-C, MPAS  [facility] [place]: Phone: [phone], Fax: [phone]  [facility]: Phone: [phone],  Fax: [phone]  ____________________________________________    CC: Derm Problem (HS - groin, flare.)      HPI:  Ms. Queenie Rutledge is a 36 year old female who presents today as a return patient for HS flare and itching on the nipples.    Notes same spot in R groin is continuing to flare. S/p two rounds antibiotics and ILK in October 2020. Since that time she has kept vaseline and guaze on it. The area has continued to be open and really has never completely healed - although much improved from baseline. She has not really been using the clindamycin or hibiclens - she has not able to get it from the pharmacy she says. Hx of pilonidal disease in the past. Mild acne, occasionally acneiform papules on scalp with dandruff she reports. Has had nodules appear in the groin and underarms as well once on the chest and once on the back, but they have never opened up and drained, they have just been isolated incidents.     Reports she would like to have liposuction, but cannot due to an ongoing nonhealing lesion on the groin. On depo provera inj, unable to correlate HS flares with menstrual cycle.     Patient is otherwise feeling well, without additional concerns.    Labs:  none    Physical Exam:  Vitals: There were no vitals taken for this visit.  SKIN: Focused examination of head and neck as well as groin and legs was performed.   - R groin - 2cm nodular lesion, no draining tracts or open areas. Mildly tender with palpation.  - HS provider assessment completed   - No other lesions of concern on areas examined.     Medications:  Current Outpatient Medications   Medication     benzoyl peroxide 5 % external liquid     chlorhexidine (HIBICLENS) 4 % liquid     clindamycin (CLEOCIN T) 1 % external  lotion     doxycycline monohydrate (MONODOX) 100 MG capsule     ketotifen (ZADITOR) 0.025 % ophthalmic solution     MedroxyPROGESTERone Acetate (DEPO-PROVERA IM)     No current facility-administered medications for this visit.       Past Medical/Surgical History:   Patient Active Problem List   Diagnosis     Glaucoma suspect     Glaucoma suspect, bilateral     Past Medical History:   Diagnosis Date     Glaucoma        CC Dr. Cartwright on close of this encounter.                 Drug Administration Record    Prior to injection, verified patient identity using patient's name and date of birth.  Due to injection administration, patient instructed to remain in clinic for 15 minutes  afterwards, and to report any adverse reaction to me immediately.    Drug Name: triamcinolone acetonide(kenalog)  Dose: 0.8mL of triamcinolone 10mg/mL, 8mg dose  Route administered: ID  NDC #: Kenalog-10 (1763-9837-56)  Amount of waste(mL):4.2  Reason for waste: Multi dose vial    LOT #: [id number]  SITE: see note  : [employer]  EXPIRATION DATE: 4/2022

## 2021-04-24 ENCOUNTER — HEALTH MAINTENANCE LETTER (OUTPATIENT)
Age: 36
End: 2021-04-24

## 2021-08-26 ENCOUNTER — OFFICE VISIT (OUTPATIENT)
Dept: DERMATOLOGY | Facility: CLINIC | Age: 36
End: 2021-08-26
Payer: COMMERCIAL

## 2021-08-26 DIAGNOSIS — L73.2 HIDRADENITIS SUPPURATIVA: Primary | ICD-10-CM

## 2021-08-26 PROCEDURE — 11900 INJECT SKIN LESIONS </W 7: CPT | Mod: GC | Performed by: DERMATOLOGY

## 2021-08-26 RX ORDER — HYDROCORTISONE 25 MG/G
OINTMENT TOPICAL 2 TIMES DAILY
Qty: 30 G | Refills: 1 | Status: SHIPPED | OUTPATIENT
Start: 2021-08-26 | End: 2023-10-09

## 2021-08-26 ASSESSMENT — PAIN SCALES - GENERAL: PAINLEVEL: NO PAIN (0)

## 2021-08-26 NOTE — NURSING NOTE
Dermatology Rooming Note    Queenie Rutledge's goals for this visit include:   Chief Complaint   Patient presents with     Derm Problem     Here today for HS flare in her left inner thigh     Denisha Cho RN

## 2021-08-26 NOTE — PROGRESS NOTES
[facility] Dermatology Note  Encounter Date: Aug 26, 2021  Office Visit      Dermatology Problem List:  # Mild hidradenitis suppurativa vs recurrent furunculosis - axilla, groin. Favor HS due to history of pilonidal cyst.  - 10/28/20 ILK to R groin, 8/25/21 L groin   - Current tx: Hibiclens 4% wash daily, Clindamycin 1% BID when flares, birth control (depo inj), doxycycline 100mg po BID x10 days with flares  -S/p BPO 5% wash, hx of I&D   - Future considerations: Spironolactone, Humira, isotretinoin  # Dermatitis - bilateral areolae - desonide 0.05% cream    Social: would like liposuction, but unable to schedule due to ongoing open lesions 2/2 HS   ____________________________________________    Assessment & Plan:  # HS - Lopez stage I.   - IL-K injections performed today (see procedure note(s) below).  - restart hibiclens wash 2-3x weekly for prevention  - future considerations: spironolactone, Humira, isotretinoin    Procedures Performed:   - Intra-lesional triamcinolone procedure note. After positioning and cleansing with isopropyl alcohol, 1.0 total mL of triamcinolone 10 mg/mL was injected into 3 lesion(s) on the L groin. The patient tolerated the procedure well and left the dermatology clinic in good condition.     Follow-up: prn for new or changing lesions    Staff and resident:     Irene Nelson MD     The patient was seen by and staffed with Dr. Brandyn MD     ____________________________________________    CC: Derm Problem (Here today for HS flare in her left inner thigh)      HPI:  Ms. Queenie Rutledge is a 36 year old female who presents today as a return patient for HS flare.  Flare in the left groin area, she has a nodule that she thinks is coming.  Very tender.  Not yet draining.  Not using anything currently for HS.  Would like kenalog injection today     Patient is otherwise feeling well, without additional concerns.    Labs:  none    Physical Exam:  Vitals: There were no  vitals taken for this visit.  SKIN: Focused examination of head and neck as well as groin and legs was performed.   - L groin: patch of erythema, superior to this is a tender subcutaneous nodule, no draining tracts or open areas  - No other lesions of concern on areas examined.     Medications:  Current Outpatient Medications   Medication     benzoyl peroxide 5 % external liquid     chlorhexidine (HIBICLENS) 4 % liquid     clindamycin (CLEOCIN T) 1 % external lotion     doxycycline monohydrate (MONODOX) 100 MG capsule     MedroxyPROGESTERone Acetate (DEPO-PROVERA IM)     desonide (DESOWEN) 0.05 % external cream     ketotifen (ZADITOR) 0.025 % ophthalmic solution     No current facility-administered medications for this visit.      Past Medical/Surgical History:   Patient Active Problem List   Diagnosis     Glaucoma suspect     Glaucoma suspect, bilateral     Past Medical History:   Diagnosis Date     Glaucoma

## 2021-08-26 NOTE — LETTER
8/26/2021       RE: Queenie Rutledge  [address] [address]     Dear Colleague,    Thank you for referring your patient, Queenie Rutledge, to the [facility] DERMATOLOGY CLINIC [place] at [facility]. Please see a copy of my visit note below.    [facility] Dermatology Note  Encounter Date: Aug 26, 2021  Office Visit      Dermatology Problem List:  # Mild hidradenitis suppurativa vs recurrent furunculosis - axilla, groin. Favor HS due to history of pilonidal cyst.  - 10/28/20 ILK to R groin, 8/25/21 L groin   - Current tx: Hibiclens 4% wash daily, Clindamycin 1% BID when flares, birth control (depo inj), doxycycline 100mg po BID x10 days with flares  -S/p BPO 5% wash, hx of I&D   - Future considerations: Spironolactone, Humira, isotretinoin  # Dermatitis - bilateral areolae - desonide 0.05% cream    Social: would like liposuction, but unable to schedule due to ongoing open lesions 2/2 HS   ____________________________________________    Assessment & Plan:  # HS - Lopez stage I.   - IL-K injections performed today (see procedure note(s) below).  - restart hibiclens wash 2-3x weekly for prevention  - future considerations: spironolactone, Humira, isotretinoin    Procedures Performed:   - Intra-lesional triamcinolone procedure note. After positioning and cleansing with isopropyl alcohol, 1.0 total mL of triamcinolone 10 mg/mL was injected into 3 lesion(s) on the L groin. The patient tolerated the procedure well and left the dermatology clinic in good condition.     Follow-up: prn for new or changing lesions    Staff and resident:     Irene Nelson MD     The patient was seen by and staffed with Dr. Brandyn MD     ____________________________________________    CC: Derm Problem (Here today for HS flare in her left inner thigh)      HPI:  Ms. Queenie Rutledge is a 36 year old female who presents today as a return patient  for HS flare.  Flare in the left groin area, she has a nodule that she thinks is coming.  Very tender.  Not yet draining.  Not using anything currently for HS.  Would like kenalog injection today     Patient is otherwise feeling well, without additional concerns.    Labs:  none    Physical Exam:  Vitals: There were no vitals taken for this visit.  SKIN: Focused examination of head and neck as well as groin and legs was performed.   - L groin: patch of erythema, superior to this is a tender subcutaneous nodule, no draining tracts or open areas  - No other lesions of concern on areas examined.     Medications:  Current Outpatient Medications   Medication     benzoyl peroxide 5 % external liquid     chlorhexidine (HIBICLENS) 4 % liquid     clindamycin (CLEOCIN T) 1 % external lotion     doxycycline monohydrate (MONODOX) 100 MG capsule     MedroxyPROGESTERone Acetate (DEPO-PROVERA IM)     desonide (DESOWEN) 0.05 % external cream     ketotifen (ZADITOR) 0.025 % ophthalmic solution     No current facility-administered medications for this visit.      Past Medical/Surgical History:   Patient Active Problem List   Diagnosis     Glaucoma suspect     Glaucoma suspect, bilateral     Past Medical History:   Diagnosis Date     Glaucoma

## 2021-09-17 NOTE — PROGRESS NOTES
I talked with and examined Queenie Rutledge and I agree with the assessment and the plan. I was present for the injection  procedure. ARNALDO Ahumada MD.

## 2021-09-21 ENCOUNTER — APPOINTMENT (OUTPATIENT)
Dept: URGENT CARE | Facility: URGENT CARE | Age: 36
End: 2021-09-21
Payer: COMMERCIAL

## 2021-09-21 ENCOUNTER — LAB REQUISITION (OUTPATIENT)
Dept: LAB | Facility: CLINIC | Age: 36
End: 2021-09-21

## 2021-09-21 LAB — SARS-COV-2 RNA RESP QL NAA+PROBE: NEGATIVE

## 2021-09-21 PROCEDURE — U0003 INFECTIOUS AGENT DETECTION BY NUCLEIC ACID (DNA OR RNA); SEVERE ACUTE RESPIRATORY SYNDROME CORONAVIRUS 2 (SARS-COV-2) (CORONAVIRUS DISEASE [COVID-19]), AMPLIFIED PROBE TECHNIQUE, MAKING USE OF HIGH THROUGHPUT TECHNOLOGIES AS DESCRIBED BY CMS-2020-01-R: HCPCS | Performed by: INTERNAL MEDICINE

## 2021-09-24 ENCOUNTER — APPOINTMENT (OUTPATIENT)
Dept: URGENT CARE | Facility: URGENT CARE | Age: 36
End: 2021-09-24
Payer: COMMERCIAL

## 2021-09-24 ENCOUNTER — LAB REQUISITION (OUTPATIENT)
Dept: LAB | Facility: CLINIC | Age: 36
End: 2021-09-24

## 2021-09-24 LAB — SARS-COV-2 RNA RESP QL NAA+PROBE: NEGATIVE

## 2021-09-24 PROCEDURE — U0005 INFEC AGEN DETEC AMPLI PROBE: HCPCS | Performed by: INTERNAL MEDICINE

## 2021-10-03 ENCOUNTER — HEALTH MAINTENANCE LETTER (OUTPATIENT)
Age: 36
End: 2021-10-03

## 2021-10-21 ENCOUNTER — TELEPHONE (OUTPATIENT)
Dept: DERMATOLOGY | Facility: CLINIC | Age: 36
End: 2021-10-21

## 2021-10-21 ENCOUNTER — OFFICE VISIT (OUTPATIENT)
Dept: DERMATOLOGY | Facility: CLINIC | Age: 36
End: 2021-10-21
Payer: COMMERCIAL

## 2021-10-21 DIAGNOSIS — L30.4 INTERTRIGO: ICD-10-CM

## 2021-10-21 DIAGNOSIS — L73.2 HIDRADENITIS SUPPURATIVA: Primary | ICD-10-CM

## 2021-10-21 PROCEDURE — 99214 OFFICE O/P EST MOD 30 MIN: CPT | Mod: GC

## 2021-10-21 RX ORDER — SPIRONOLACTONE 100 MG/1
100 TABLET, FILM COATED ORAL DAILY
Qty: 90 TABLET | Refills: 3 | Status: SHIPPED | OUTPATIENT
Start: 2021-10-21 | End: 2022-11-17

## 2021-10-21 RX ORDER — KETOCONAZOLE 20 MG/G
CREAM TOPICAL DAILY
Qty: 60 G | Refills: 3 | Status: SHIPPED | OUTPATIENT
Start: 2021-10-21 | End: 2022-01-11

## 2021-10-21 RX ORDER — HYDROCORTISONE 25 MG/G
CREAM TOPICAL
Qty: 30 G | Refills: 3 | Status: SHIPPED | OUTPATIENT
Start: 2021-10-21 | End: 2023-10-09

## 2021-10-21 ASSESSMENT — PAIN SCALES - GENERAL: PAINLEVEL: NO PAIN (0)

## 2021-10-21 NOTE — TELEPHONE ENCOUNTER
[facility] Call Center    Phone Message    May a detailed message be left on voicemail: yes     Reason for Call: Other: This pt has a referral for Dr. Valencia in [facility] however her template is not available. Please review the referral from 10/21 for Laser hair removal for HS and schedule with Dr. Valencia at  Derm. Thanks!     Action Taken: Message routed to:  Adult Clinics: Dermatology p [phone]    Travel Screening: Not Applicable

## 2021-10-21 NOTE — PATIENT INSTRUCTIONS
1. Hidradenitis suppurativa  - spironolactone (ALDACTONE) 100 MG tablet; Take 1 tablet (100 mg) by mouth daily Take 50 mg daily (1/2 pill) at night for 2 weeks.  If no side effects, start taking 100 mg (full pill) at night.  Dispense: 90 tablet; Refill: 3  - continue Hibiclens 2-3x/ week   - Adult Dermatology Referral; Future  - future we will consider isotretinoin or humira     2. Intertrigo  - ketoconazole (NIZORAL) 2 % external cream; Apply topically daily To groin area  Dispense: 60 g; Refill: 3  - hydrocortisone, Perianal, (HYDROCORTISONE) 2.5 % cream; Apply to inguinal groin area 1-2 times daily as needed  Dispense: 30 g; Refill: 3          Will start 50mg daily which may be increased to BID after one-two weeks if tolerated.  The patient knows to stay hydrated on this and that it is not safe in pregnancy.  Reviewed side effects of hyperkalemia and cardia arrhythmias, increased urination, light headedness, breast tenderness and break through bleeding.

## 2021-10-21 NOTE — PROGRESS NOTES
[facility] Dermatology Note  Encounter Date: Oct 21, 2021  Office Visit      Dermatology Problem List:  # Mild hidradenitis suppurativa vs recurrent furunculosis - axilla, groin. Favor HS due to history of pilonidal cyst.  - 10/28/20 ILK to R groin, 8/25/21 L groin   - Current tx: spironolactone 100 mg every day (started 10/21/21), Hibiclens 4% wash daily, Clindamycin 1% BID when flares, birth control (depo inj)  - previous tx doxycycline 100mg po BID x10 days with flares, BPO 5% wash, hx of I&D   - Future considerations: Humira, isotretinoin   # Dermatitis - bilateral areolae - desonide 0.05% cream    Social: would like liposuction, but unable to schedule due to ongoing open lesions 2/2 HS   ____________________________________________    Assessment & Plan:    1. Hidradenitis suppurativa, Lopez stage I.   - continue hibiclens wash 2-3x weekly for prevention  - spironolactone (ALDACTONE) 100 MG tablet; Take 1 tablet (100 mg) by mouth daily Take 50 mg daily (1/2 pill) at night for 2 weeks.  If no side effects, start taking 100 mg (full pill) at night.  Dispense: 90 tablet; Refill: 3. The patient knows to stay hydrated on this and that it is not safe in pregnancy.  Reviewed side effects of hyperkalemia and cardia arrhythmias, increased urination, light headedness, breast tenderness and break through bleeding.    - future considerations: Humira, isotretinoin  - Adult Dermatology Referral; Future (for laser hair removal to help with HS control in the groin area )     2. Intertrigo  - ketoconazole (NIZORAL) 2 % external cream; Apply topically daily To groin area  Dispense: 60 g; Refill: 3  - hydrocortisone, (HYDROCORTISONE) 2.5 % cream; Apply to inguinal groin area 1-2 times daily as needed  Dispense: 30 g; Refill: 3      Procedures Performed:   None     Follow-up: 3 months    Staff and resident:     Irene Nelson MD     The patient was seen and staffed with MD HALLE Álvarez Kimberly  Gabbie RUTH, saw this patient with the resident and agree with the resident s findings and plan of care as documented in the resident s note.    ____________________________________________    CC: RECHECK (pt states she is here for a HS follow up. )      HPI:  Ms. Queenie Rutledge is a 36 year old female who presents today as a return patient for HS.  Has been doing well recently since ILK injection at last visit.  Has some flares sporadically.  No active lesions.  Using Hibiclens.  Wanting to discuss oral meds for prevention of future flares.  Also inquiring about laser hair removal to groin area to see if this can help with controlling her HS.     Patient is otherwise feeling well, without additional concerns.    Labs:  none    Physical Exam:  Vitals: There were no vitals taken for this visit.  SKIN: Focused examination of head and neck as well as groin and legs was performed.   - groin: mild erythematous patches in the bilateral inguinal fold   - No other lesions of concern on areas examined.     Medications:  Current Outpatient Medications   Medication     benzoyl peroxide 5 % external liquid     chlorhexidine (HIBICLENS) 4 % liquid     clindamycin (CLEOCIN T) 1 % external lotion     doxycycline monohydrate (MONODOX) 100 MG capsule     hydrocortisone 2.5 % ointment     ketotifen (ZADITOR) 0.025 % ophthalmic solution     MedroxyPROGESTERone Acetate (DEPO-PROVERA IM)     desonide (DESOWEN) 0.05 % external cream     No current facility-administered medications for this visit.      Past Medical/Surgical History:   Patient Active Problem List   Diagnosis     Glaucoma suspect     Glaucoma suspect, bilateral     Past Medical History:   Diagnosis Date     Glaucoma

## 2021-10-21 NOTE — TELEPHONE ENCOUNTER
This is not cosmetic or new- needs to be return laser consult for HS- medical encounter. Changed appointment and updated pt to what to expect. Patient aware this is not for hair reduction but tx of HS.  Krystina Lee, CMA on 10/21/2021 at 4:46 PM

## 2021-10-21 NOTE — LETTER
10/21/2021       RE: Queenie Rutledge  [address] [address]     Dear Colleague,    Thank you for referring your patient, Queenie Rutledge, to the [facility] DERMATOLOGY CLINIC [place] at [facility]. Please see a copy of my visit note below.    [facility] Dermatology Note  Encounter Date: Oct 21, 2021  Office Visit      Dermatology Problem List:  # Mild hidradenitis suppurativa vs recurrent furunculosis - axilla, groin. Favor HS due to history of pilonidal cyst.  - 10/28/20 ILK to R groin, 8/25/21 L groin   - Current tx: spironolactone 100 mg every day (started 10/21/21), Hibiclens 4% wash daily, Clindamycin 1% BID when flares, birth control (depo inj)  - previous tx doxycycline 100mg po BID x10 days with flares, BPO 5% wash, hx of I&D   - Future considerations: Humira, isotretinoin   # Dermatitis - bilateral areolae - desonide 0.05% cream    Social: would like liposuction, but unable to schedule due to ongoing open lesions 2/2 HS   ____________________________________________    Assessment & Plan:    1. Hidradenitis suppurativa, Lopez stage I.   - continue hibiclens wash 2-3x weekly for prevention  - spironolactone (ALDACTONE) 100 MG tablet; Take 1 tablet (100 mg) by mouth daily Take 50 mg daily (1/2 pill) at night for 2 weeks.  If no side effects, start taking 100 mg (full pill) at night.  Dispense: 90 tablet; Refill: 3. The patient knows to stay hydrated on this and that it is not safe in pregnancy.  Reviewed side effects of hyperkalemia and cardia arrhythmias, increased urination, light headedness, breast tenderness and break through bleeding.    - future considerations: Humira, isotretinoin  - Adult Dermatology Referral; Future (for laser hair removal to help with HS control in the groin area )     2. Intertrigo  - ketoconazole (NIZORAL) 2 % external cream; Apply topically daily To groin area  Dispense: 60 g; Refill:  3  - hydrocortisone, (HYDROCORTISONE) 2.5 % cream; Apply to inguinal groin area 1-2 times daily as needed  Dispense: 30 g; Refill: 3      Procedures Performed:   None     Follow-up: 3 months    Staff and resident:     Irene Nelson MD     The patient was seen and staffed with Dr. Gabbie MD     I, Cuca Cartwright MD, saw this patient with the resident and agree with the resident s findings and plan of care as documented in the resident s note.    ____________________________________________    CC: RECHECK (pt states she is here for a HS follow up. )      HPI:  Ms. Queenie Rutledge is a 36 year old female who presents today as a return patient for HS.  Has been doing well recently since ILK injection at last visit.  Has some flares sporadically.  No active lesions.  Using Hibiclens.  Wanting to discuss oral meds for prevention of future flares.  Also inquiring about laser hair removal to groin area to see if this can help with controlling her HS.     Patient is otherwise feeling well, without additional concerns.    Labs:  none    Physical Exam:  Vitals: There were no vitals taken for this visit.  SKIN: Focused examination of head and neck as well as groin and legs was performed.   - groin: mild erythematous patches in the bilateral inguinal fold   - No other lesions of concern on areas examined.     Medications:  Current Outpatient Medications   Medication     benzoyl peroxide 5 % external liquid     chlorhexidine (HIBICLENS) 4 % liquid     clindamycin (CLEOCIN T) 1 % external lotion     doxycycline monohydrate (MONODOX) 100 MG capsule     hydrocortisone 2.5 % ointment     ketotifen (ZADITOR) 0.025 % ophthalmic solution     MedroxyPROGESTERone Acetate (DEPO-PROVERA IM)     desonide (DESOWEN) 0.05 % external cream     No current facility-administered medications for this visit.      Past Medical/Surgical History:   Patient Active Problem List   Diagnosis     Glaucoma suspect     Glaucoma suspect, bilateral      Past Medical History:   Diagnosis Date     Glaucoma

## 2021-11-30 ENCOUNTER — OFFICE VISIT (OUTPATIENT)
Dept: DERMATOLOGY | Facility: CLINIC | Age: 36
End: 2021-11-30
Payer: COMMERCIAL

## 2021-11-30 DIAGNOSIS — L73.2 HIDRADENITIS SUPPURATIVA: Primary | ICD-10-CM

## 2021-11-30 DIAGNOSIS — L81.8 POST INFLAMMATORY HYPOPIGMENTATION: ICD-10-CM

## 2021-11-30 DIAGNOSIS — B37.31 VAGINAL CANDIDIASIS: ICD-10-CM

## 2021-11-30 DIAGNOSIS — L30.4 INTERTRIGO: ICD-10-CM

## 2021-11-30 PROCEDURE — 99214 OFFICE O/P EST MOD 30 MIN: CPT | Mod: GC | Performed by: DERMATOLOGY

## 2021-11-30 RX ORDER — FLUCONAZOLE 150 MG/1
150 TABLET ORAL DAILY
Qty: 1 TABLET | Refills: 0 | Status: SHIPPED | OUTPATIENT
Start: 2021-11-30 | End: 2022-01-11

## 2021-11-30 RX ORDER — FLUCONAZOLE 150 MG/1
150 TABLET ORAL DAILY
Qty: 1 TABLET | Refills: 0 | Status: SHIPPED | OUTPATIENT
Start: 2021-11-30 | End: 2021-11-30

## 2021-11-30 ASSESSMENT — PAIN SCALES - GENERAL: PAINLEVEL: MODERATE PAIN (4)

## 2021-11-30 NOTE — LETTER
11/30/2021       RE: Queenie Rutledge  [address] [address]     Dear Colleague,    Thank you for referring your patient, Queenie Rutledge, to the [facility] DERMATOLOGY CLINIC [place] at [facility]. Please see a copy of my visit note below.    [facility] Dermatology Note  Encounter Date: Nov 30, 2021  Office Visit     Dermatology Problem List:  # Mild hidradenitis suppurativa vs recurrent furunculosis - axilla, groin. Favor HS due to history of pilonidal cyst.  - 10/28/20 ILK to R groin, 8/25/21 L groin   - Current tx: spironolactone 100 mg every day (prescribed 10/21/21, planning to start soon), Hibiclens 4% wash daily, Clindamycin 1% BID when flares, birth control (depo inj)  - previous tx doxycycline 100mg po BID x10 days with flares, BPO 5% wash, hx of I&D   - Future considerations: Humira, isotretinoin   # Dermatitis - bilateral areolae - desonide 0.05% cream  # Intertrigo -  hydrocortisone      Social: would like liposuction, but unable to schedule due to ongoing open lesions 2/2 HS     ____________________________________________    Assessment & Plan:  # Hidradenitis suppurativa, Lopez stage I. Chronic, active.   Recently flared lesion appears to be healing today. Offered ILK but not needed today. As she has not started spironolactone, advised to try this and continue topicals.  - continue hibiclens wash 2-3x weekly for prevention  - continue clindamycin lotion BID prn  - Start spironolactone 50 mg daily x 1-2 weeks, then increase to 100 mg daily if tolerated  - future considerations: Humira, isotretinoin     # Intertrigo. Some irritation and tenderness to skin in the groin folds and labia. No significant redness to the skin. No overt candidiasis to warrant systemics.  - continue hydrocortisone 2.5% cream mixed with ketoconazole cream prn    # Hypopigmented macule, L thigh.   Woods lamp negative. Possibly  post-inflammatory hypopigmentation? Advised empiric hydrocortisone with close monitoring.  - start hydrocortisone 2.5% cream BID prn  - If growing or changing, return for reevaluation    # Vaginal fullness/itching.   Patient concerned for vaginal candidiasis as this reminds her of prior infections. Will trial empiric fluconazole x1 dose. Discussed the importance of following up with PCP if symptoms do not resolve for further workup and evaluation.   - start fluconazole 150 mg daily x1 dose  - follow-up with PCP if does not resolve    Procedures Performed:   None    Follow-up: As previously scheduled    Staff and Scribe:     Kaylaibe Disclosure:      Margaux Mcintyre MD PGY3  [facility]  11/30/2021, 11:02 AM    Staff Physician Comments:   I saw and evaluated the patient with the resident and I agree with the assessment and plan.  I was present for the examination.    Jessica Grady MD    Department of Dermatology  [facility]: Phone: [phone], Fax: [phone]  12/6/2021     ____________________________________________    CC: Derm Problem (HS follow up, states that she is having a flare of the HS on the inner thighs)    HPI:  Ms. Queenie Rutledge is a(n) 36 year old female who presents today, last seen by Dr. Nelson on 10/21/21, as a return patient for follow-up of her HS. Patient was advised to start spironolactone for her HS. She was also started on ketoconazole 2% cream to be applied daily and hydrocortisone 2.5% cream BID as needed for her intertrigo.     Since last visit, she is doing okay. She has had irritation on the bikini line for about 9 months now. She now wears boyshort underwear to prevent further irritation to the area. Creams are difficult to consistently use. She did not start the hydrocortisone, and she used the ketoconazole cream one time. She is wondering if she could try an  oral antifungal option.     She has noticed an area of skin lightening on the upper thigh.     She also had a new HS lesion on the upper left inner thigh. Has been using hot baths and ibuprofen with good effect. Notably, she has not started the spirolactone prescribed at the previous visit as she was concerned about starting a new medication once she noticed the light skin patch.     She would like to start the spironolactone soon, but wants to make sure it will not interact with possible new medications prescribed for the intertrigo.     She has noticed a fullness feeling in her vaginal area. Wonders if she has a yeast infection and would benefit from antifungal medication.     Patient is otherwise feeling well, without additional skin concerns.    Labs Reviewed:  N/A    Physical Exam:  Vitals: There were no vitals taken for this visit.  SKIN: Focused examination of the groin and genital area was performed.  - Left upper thigh - 1 cm round macule that is lighter than the surrounding skin, not bright under black light.   - Left medial upper thigh - soft papule, no redness, swelling, or drainage. Nontender to palpation.   - No erythematous patches noted in the bilateral inguinal fold, but tender to the touch. Mild erythema of the labia bilaterally.    - No other lesions of concern on areas examined.     Medications:  Current Outpatient Medications   Medication     benzoyl peroxide 5 % external liquid     chlorhexidine (HIBICLENS) 4 % liquid     clindamycin (CLEOCIN T) 1 % external lotion     desonide (DESOWEN) 0.05 % external cream     doxycycline monohydrate (MONODOX) 100 MG capsule     fluconazole (DIFLUCAN) 150 MG tablet     hydrocortisone 2.5 % ointment     hydrocortisone, Perianal, (HYDROCORTISONE) 2.5 % cream     ketoconazole (NIZORAL) 2 % external cream     ketotifen (ZADITOR) 0.025 % ophthalmic solution     MedroxyPROGESTERone Acetate (DEPO-PROVERA IM)     spironolactone (ALDACTONE) 100 MG tablet     No  current facility-administered medications for this visit.      Past Medical History:   Patient Active Problem List   Diagnosis     Glaucoma suspect     Glaucoma suspect, bilateral     Past Medical History:   Diagnosis Date     Glaucoma         CC No referring provider defined for this encounter. on close of this encounter.

## 2021-11-30 NOTE — PROGRESS NOTES
[facility] Dermatology Note  Encounter Date: Nov 30, 2021  Office Visit     Dermatology Problem List:  # Mild hidradenitis suppurativa vs recurrent furunculosis - axilla, groin. Favor HS due to history of pilonidal cyst.  - 10/28/20 ILK to R groin, 8/25/21 L groin   - Current tx: spironolactone 100 mg every day (prescribed 10/21/21, planning to start soon), Hibiclens 4% wash daily, Clindamycin 1% BID when flares, birth control (depo inj)  - previous tx doxycycline 100mg po BID x10 days with flares, BPO 5% wash, hx of I&D   - Future considerations: Humira, isotretinoin   # Dermatitis - bilateral areolae - desonide 0.05% cream  # Intertrigo -  hydrocortisone      Social: would like liposuction, but unable to schedule due to ongoing open lesions 2/2 HS     ____________________________________________    Assessment & Plan:  # Hidradenitis suppurativa, Lopez stage I. Chronic, active.   Recently flared lesion appears to be healing today. Offered ILK but not needed today. As she has not started spironolactone, advised to try this and continue topicals.  - continue hibiclens wash 2-3x weekly for prevention  - continue clindamycin lotion BID prn  - Start spironolactone 50 mg daily x 1-2 weeks, then increase to 100 mg daily if tolerated  - future considerations: Humira, isotretinoin     # Intertrigo. Some irritation and tenderness to skin in the groin folds and labia. No significant redness to the skin. No overt candidiasis to warrant systemics.  - continue hydrocortisone 2.5% cream mixed with ketoconazole cream prn    # Hypopigmented macule, L thigh.   Woods lamp negative. Possibly post-inflammatory hypopigmentation? Advised empiric hydrocortisone with close monitoring.  - start hydrocortisone 2.5% cream BID prn  - If growing or changing, return for reevaluation    # Vaginal fullness/itching.   Patient concerned for vaginal candidiasis as this reminds her of prior infections. Will trial empiric  fluconazole x1 dose. Discussed the importance of following up with PCP if symptoms do not resolve for further workup and evaluation.   - start fluconazole 150 mg daily x1 dose  - follow-up with PCP if does not resolve    Procedures Performed:   None    Follow-up: As previously scheduled    Staff and Scribe:     Kaylaibbree Disclosure:      Margaux Mcintyre MD PGY3  [facility]  11/30/2021, 11:02 AM    Staff Physician Comments:   I saw and evaluated the patient with the resident and I agree with the assessment and plan.  I was present for the examination.    Jessica Grady MD    Department of Dermatology  [facility]: Phone: [phone], Fax: [phone]  12/6/2021     ____________________________________________    CC: Derm Problem (HS follow up, states that she is having a flare of the HS on the inner thighs)    HPI:  Ms. Queenie Rutledge is a(n) 36 year old female who presents today, last seen by Dr. Nelson on 10/21/21, as a return patient for follow-up of her HS. Patient was advised to start spironolactone for her HS. She was also started on ketoconazole 2% cream to be applied daily and hydrocortisone 2.5% cream BID as needed for her intertrigo.     Since last visit, she is doing okay. She has had irritation on the bikini line for about 9 months now. She now wears boyshort underwear to prevent further irritation to the area. Creams are difficult to consistently use. She did not start the hydrocortisone, and she used the ketoconazole cream one time. She is wondering if she could try an oral antifungal option.     She has noticed an area of skin lightening on the upper thigh.     She also had a new HS lesion on the upper left inner thigh. Has been using hot baths and ibuprofen with good effect. Notably, she has not started the spirolactone prescribed at the previous visit as she was concerned about  starting a new medication once she noticed the light skin patch.     She would like to start the spironolactone soon, but wants to make sure it will not interact with possible new medications prescribed for the intertrigo.     She has noticed a fullness feeling in her vaginal area. Wonders if she has a yeast infection and would benefit from antifungal medication.     Patient is otherwise feeling well, without additional skin concerns.    Labs Reviewed:  N/A    Physical Exam:  Vitals: There were no vitals taken for this visit.  SKIN: Focused examination of the groin and genital area was performed.  - Left upper thigh - 1 cm round macule that is lighter than the surrounding skin, not bright under black light.   - Left medial upper thigh - soft papule, no redness, swelling, or drainage. Nontender to palpation.   - No erythematous patches noted in the bilateral inguinal fold, but tender to the touch. Mild erythema of the labia bilaterally.    - No other lesions of concern on areas examined.     Medications:  Current Outpatient Medications   Medication     benzoyl peroxide 5 % external liquid     chlorhexidine (HIBICLENS) 4 % liquid     clindamycin (CLEOCIN T) 1 % external lotion     desonide (DESOWEN) 0.05 % external cream     doxycycline monohydrate (MONODOX) 100 MG capsule     fluconazole (DIFLUCAN) 150 MG tablet     hydrocortisone 2.5 % ointment     hydrocortisone, Perianal, (HYDROCORTISONE) 2.5 % cream     ketoconazole (NIZORAL) 2 % external cream     ketotifen (ZADITOR) 0.025 % ophthalmic solution     MedroxyPROGESTERone Acetate (DEPO-PROVERA IM)     spironolactone (ALDACTONE) 100 MG tablet     No current facility-administered medications for this visit.      Past Medical History:   Patient Active Problem List   Diagnosis     Glaucoma suspect     Glaucoma suspect, bilateral     Past Medical History:   Diagnosis Date     Glaucoma         CC No referring provider defined for this encounter. on close of this  encounter.

## 2021-11-30 NOTE — NURSING NOTE
Dermatology Rooming Note    Queenie Rutledge's goals for this visit include:   Chief Complaint   Patient presents with     Derm Problem     HS follow up, states that she is having a flare of the HS on the inner thighs     Shantelle Mcfarland CMA on 11/30/2021 at 10:48 AM

## 2022-01-11 ENCOUNTER — OFFICE VISIT (OUTPATIENT)
Dept: DERMATOLOGY | Facility: CLINIC | Age: 37
End: 2022-01-11
Attending: DERMATOLOGY
Payer: COMMERCIAL

## 2022-01-11 DIAGNOSIS — L73.2 HIDRADENITIS SUPPURATIVA: ICD-10-CM

## 2022-01-11 PROCEDURE — 99213 OFFICE O/P EST LOW 20 MIN: CPT | Performed by: DERMATOLOGY

## 2022-01-11 ASSESSMENT — PAIN SCALES - GENERAL: PAINLEVEL: NO PAIN (0)

## 2022-01-11 NOTE — LETTER
1/11/2022         RE: Queenie Rutledge  [address] [address]        Dear Colleague,    Thank you for referring your patient, Queenie Rutledge, to the [facility]. Please see a copy of my visit note below.    [facility] Dermatology Note  Encounter Date: Jan 11, 2022  Office Visit     Dermatology Problem List:  1. Mild hidradenitis suppurativa vs recurrent furunculosis - axilla, groin. Favor HS due to history of pilonidal cyst.  - 10/28/20 ILK to R groin, 8/25/21 L groin   - Current tx: spironolactone 100 mg every day (started 12/2021), Hibiclens 4% wash daily, Clindamycin 1% BID when flares, birth control (depo inj)  - Previous tx: doxycycline 100mg po BID x10 days with flares, BPO 5% wash, hx of I&D   - Future considerations: Humira, isotretinoin   2. Dermatitis - bilateral areolae - desonide 0.05% cream  3. Intertrigo -  hydrocortisone 2.5% cream, keto 2% cream  4. Hypopigmented macule, possible PIH, L thigh -  hydrocortisone 2.5% cream     Social: Would like liposuction, but unable to schedule due to ongoing open lesions 2/2 HS   ____________________________________________    Assessment & Plan:     # HS, axillae and groin: Chronic, flared. Lopez stage I currently.   - The most studies on laser in HS have been conducted on Nd:YAG lasers. Typical settings generally used a 10-mm spot size with a 10-ms pulse duration and 35 to 50 J/cm2 in patients with Forman skin type I to III and a 20-ms pulse duration and 25 to 40 J/cm2 in patients with skintypes IV to VI. In general, settings may vary by specific device and selected spot size and their use should be guided by  experience with an end point of delayed post-treatment perifollicular erythema and/or edema for follicular destruction. In most studies, 3 or 4 treatment sessions were performed, though additional treatment to further reduce follicular units may provide more lasting  benefit.  - Discussed with patient that laser and settings used is different than what would be used for laser hair removal. With the HS settings, there is some loss of hair but that is not the main focus of treatment. Notes understanding of this. Discussed potential risks of laser. Notes understanding.  - Plan to start with treating inguinal creases and see how she does.    Procedures Performed:   N/A    Follow-up: depending on insurance coverage    Staff and Scribe:     Scribe Disclosure:   I, Martha Griffiths LPN, am serving as a scribe to document services personally performed by this physician, Dr. Veronica Telles, based on data collection and the provider's statements to me.     Provider Disclosure:   The documentation recorded by the scribe accurately reflects the services I personally performed and the decisions made by me.    Veronica Telles MD    Department of Dermatology  [facility]: Phone: [phone], Fax:[phone]  [facility]: Phone: [phone], Fax: [phone]    ____________________________________________    CC: Laser Consultation (for HS for groin and underarms )    HPI:  Ms. Queenie Rutledge is a(n) 36 year old female who presents today as a return patient for laser treatment consultl.    Last seen by Dr. Grady for HS 11/30/21. Referral by Dr. Nelson 10/21/21 for laser consideration.    Today, Queenie notes her bilateral axilla are fairly clear and she has one healing lesion on her right medial thigh. Typically only has lesion in groin folds, rare in underarms. One possible lesion on vagina in the past.    Patient is otherwise feeling well, without additional skin concerns.     Labs Reviewed:  N/A    Physical Exam:  Vitals: There were no vitals taken for this visit.  SKIN: Focused examination of axilla and groin were performed.  - Forman Type IV-V  - One  PIH macule in right axilla  - Left axilla is clear  - One <1 cm subcutaneous nodule on left medial thigh  - Scatterd PIH in the inguinal creases.  - No other lesions of concern on areas examined.       Medications:  Current Outpatient Medications   Medication     benzoyl peroxide 5 % external liquid     chlorhexidine (HIBICLENS) 4 % liquid     clindamycin (CLEOCIN T) 1 % external lotion     hydrocortisone 2.5 % ointment     hydrocortisone, Perianal, (HYDROCORTISONE) 2.5 % cream     MedroxyPROGESTERone Acetate (DEPO-PROVERA IM)     spironolactone (ALDACTONE) 100 MG tablet     desonide (DESOWEN) 0.05 % external cream     doxycycline monohydrate (MONODOX) 100 MG capsule     fluconazole (DIFLUCAN) 150 MG tablet     ketoconazole (NIZORAL) 2 % external cream     ketotifen (ZADITOR) 0.025 % ophthalmic solution     No current facility-administered medications for this visit.      Past Medical History:   Patient Active Problem List   Diagnosis     Glaucoma suspect     Glaucoma suspect, bilateral     Past Medical History:   Diagnosis Date     Glaucoma        CC Christa Valencia MD  [address] [address] on close of this encounter.        Again, thank you for allowing me to participate in the care of your patient.        Sincerely,        Veronica Telles MD

## 2022-01-11 NOTE — NURSING NOTE
Queenie Rutledge's goals for this visit include:   Chief Complaint   Patient presents with     Laser Consultation     for HS for groin and underarms        She requests these members of her care team be copied on today's visit information:     PCP: Bharat Lopez    Referring Provider:  Christa Valencia MD  [address] [place] [id number]    There were no vitals taken for this visit.    Do you need any medication refills at today's visit? No    Veronica Chavez, [facility]

## 2022-01-11 NOTE — PROGRESS NOTES
[facility] Dermatology Note  Encounter Date: Jan 11, 2022  Office Visit     Dermatology Problem List:  1. Mild hidradenitis suppurativa vs recurrent furunculosis - axilla, groin. Favor HS due to history of pilonidal cyst.  - 10/28/20 ILK to R groin, 8/25/21 L groin   - Current tx: spironolactone 100 mg every day (started 12/2021), Hibiclens 4% wash daily, Clindamycin 1% BID when flares, birth control (depo inj)  - Previous tx: doxycycline 100mg po BID x10 days with flares, BPO 5% wash, hx of I&D   - Future considerations: Humira, isotretinoin   2. Dermatitis - bilateral areolae - desonide 0.05% cream  3. Intertrigo -  hydrocortisone 2.5% cream, keto 2% cream  4. Hypopigmented macule, possible PIH, L thigh -  hydrocortisone 2.5% cream     Social: Would like liposuction, but unable to schedule due to ongoing open lesions 2/2 HS   ____________________________________________    Assessment & Plan:     # HS, axillae and groin: Chronic, flared. Lopez stage I currently.   - The most studies on laser in HS have been conducted on Nd:YAG lasers. Typical settings generally used a 10-mm spot size with a 10-ms pulse duration and 35 to 50 J/cm2 in patients with Forman skin type I to III and a 20-ms pulse duration and 25 to 40 J/cm2 in patients with skintypes IV to VI. In general, settings may vary by specific device and selected spot size and their use should be guided by  experience with an end point of delayed post-treatment perifollicular erythema and/or edema for follicular destruction. In most studies, 3 or 4 treatment sessions were performed, though additional treatment to further reduce follicular units may provide more lasting benefit.  - Discussed with patient that laser and settings used is different than what would be used for laser hair removal. With the HS settings, there is some loss of hair but that is not the main focus of treatment. Notes understanding of this. Discussed  potential risks of laser. Notes understanding.  - Plan to start with treating inguinal creases and see how she does.    Procedures Performed:   N/A    Follow-up: depending on insurance coverage    Staff and Scribe:     Scribe Disclosure:   I, Martha Griffiths LPN, am serving as a scribe to document services personally performed by this physician, Dr. Veronica Telles, based on data collection and the provider's statements to me.     Provider Disclosure:   The documentation recorded by the scribe accurately reflects the services I personally performed and the decisions made by me.    Veronica Telles MD    Department of Dermatology  [facility]: Phone: [phone], Fax:[phone]  [facility]: Phone: [phone], Fax: [phone]    ____________________________________________    CC: Laser Consultation (for HS for groin and underarms )    HPI:  Ms. Queenie Rutledge is a(n) 36 year old female who presents today as a return patient for laser treatment consultl.    Last seen by Dr. Grady for HS 11/30/21. Referral by Dr. Nelson 10/21/21 for laser consideration.    Today, Queenie notes her bilateral axilla are fairly clear and she has one healing lesion on her right medial thigh. Typically only has lesion in groin folds, rare in underarms. One possible lesion on vagina in the past.    Patient is otherwise feeling well, without additional skin concerns.     Labs Reviewed:  N/A    Physical Exam:  Vitals: There were no vitals taken for this visit.  SKIN: Focused examination of axilla and groin were performed.  - Forman Type IV-V  - One PIH macule in right axilla  - Left axilla is clear  - One <1 cm subcutaneous nodule on left medial thigh  - Scatterd PIH in the inguinal creases.  - No other lesions of concern on areas examined.       Medications:  Current Outpatient Medications   Medication      benzoyl peroxide 5 % external liquid     chlorhexidine (HIBICLENS) 4 % liquid     clindamycin (CLEOCIN T) 1 % external lotion     hydrocortisone 2.5 % ointment     hydrocortisone, Perianal, (HYDROCORTISONE) 2.5 % cream     MedroxyPROGESTERone Acetate (DEPO-PROVERA IM)     spironolactone (ALDACTONE) 100 MG tablet     desonide (DESOWEN) 0.05 % external cream     doxycycline monohydrate (MONODOX) 100 MG capsule     fluconazole (DIFLUCAN) 150 MG tablet     ketoconazole (NIZORAL) 2 % external cream     ketotifen (ZADITOR) 0.025 % ophthalmic solution     No current facility-administered medications for this visit.      Past Medical History:   Patient Active Problem List   Diagnosis     Glaucoma suspect     Glaucoma suspect, bilateral     Past Medical History:   Diagnosis Date     Glaucoma        CC Christa Valencia MD  [address] [address] on close of this encounter.

## 2022-01-12 ENCOUNTER — TELEPHONE (OUTPATIENT)
Dept: DERMATOLOGY | Facility: CLINIC | Age: 37
End: 2022-01-12
Payer: COMMERCIAL

## 2022-01-12 DIAGNOSIS — L73.2 HIDRADENITIS SUPPURATIVA: Primary | ICD-10-CM

## 2022-01-12 NOTE — LETTER
"January 12, 2022        Re: Queenie Rutledge  [address] [address]        To Whom This May Concern,     We are writing to request coverage of long-pulsed 1064-nm Nd: YAG laser treatment for hidradenitis suppurativa. This patient has symptoms of including intermittent folliculitis and abscesses in the groin folds.     Hidradenitis responds well to laser hair removal as it is a follicular process. We would expect 1 treatment every 6-8 weeks for a maximum of 10 treatments.     The CPT Code Description utilized would be 17748: Unlisted procedure, skin, mucous membrane and subcutaneous tissue.   The approximate cost is $160 per treatment.     Queenie is currently maximally medically managed but still getting new lesions. The only FDA covered treatment for this condition is weekly humira. In addition to being more invasive, the cost of this intervention is much larger.     Please, see the following references:   Karine Peralta et al. \"Randomized control trial for the treatment of hidradenitis suppurativa with a neodymium?doped yttrium aluminium garnet laser.\" Dermatologic surgery 35.8 (2009): 8591-7690.     Zeny Saeed, et al. \"Histopathologic study of hidradenitis suppurativa following long-pulsed 1064-nm Nd: YAG laser treatment.\" Archives of dermatology 147.1 (2011): 21-28.   Josh Agustin., et al. \"Prospective controlled clinical and histopathologic study of hidradenitis suppurativa treated with the long-pulsed neodymium: yttrium-aluminium-garnet laser.\" Journal of the American Academy of Dermatology 62.4 (2010): 637-645.       We strive to provide our patient with outstanding care. Therefore, I request you please contact me at [phone] if you have any questions regarding coverage or our treatment rational.     Veronica Telles MD      Department of Dermatology   [facility]: Phone: [phone], Fax:[phone] "   [facility]: Phone: [phone], Fax: [phone]

## 2022-01-12 NOTE — TELEPHONE ENCOUNTER
Financial Counselor Review:    Please submit letter of medical necessity from Dr. Telles dated 1/12/22 to insurance.  Procedure to be performed (include CPT code):  Laser hair removal, [address]  Diagnosis:  Hidradenitis suppurativa  ICD-10 code:  L73.2  # of treatments requested:  10    Tamara Stephens RN

## 2022-01-13 NOTE — TELEPHONE ENCOUNTER
Left message to call back clinic regarding scheduling NdYag for HS.   Krystina Lee, CMA on 1/13/2022 at 3:03 PM

## 2022-01-13 NOTE — TELEPHONE ENCOUNTER
PB DOS: TBD  Type of Procedure: Yag Laser treatment  CPT Codes: 17999 x10  ICD10 Codes: L73.2, Hidradenitis suppurativa  Surgeon/Ordering provider: Veronica Telles, [id number]  Pre-cert/Authorization completed:  No PA Required  Payer: [employer]  Spoke to [employer] PA list  Ref. # / Auth #   Valid Dates:     Please advise patient to call their insurance to check coverage and benefits.

## 2022-01-14 NOTE — TELEPHONE ENCOUNTER
Pt returned the call and information below reviewed with the pt and pt also notified that a non-coverage waiver will need to be signed. Pt states understanding and would like to schedule but states she will call her insurance to check benefits/coverage. Appt scheduled..Ciera Ibrahim RN

## 2022-01-24 ENCOUNTER — MYC MEDICAL ADVICE (OUTPATIENT)
Dept: DERMATOLOGY | Facility: CLINIC | Age: 37
End: 2022-01-24
Payer: COMMERCIAL

## 2022-01-25 ENCOUNTER — OFFICE VISIT (OUTPATIENT)
Dept: DERMATOLOGY | Facility: CLINIC | Age: 37
End: 2022-01-25
Payer: COMMERCIAL

## 2022-01-25 DIAGNOSIS — L73.2 HIDRADENITIS SUPPURATIVA: Primary | ICD-10-CM

## 2022-01-25 PROCEDURE — 11900 INJECT SKIN LESIONS </W 7: CPT | Mod: GC | Performed by: DERMATOLOGY

## 2022-01-25 ASSESSMENT — PAIN SCALES - GENERAL: PAINLEVEL: MODERATE PAIN (5)

## 2022-01-25 NOTE — NURSING NOTE
Dermatology Rooming Note    Queenie Rutledge's goals for this visit include:   Chief Complaint   Patient presents with     Derm Problem     HS flare in the groin area, started about 3 days ago     Shantelle Mcfarland CMA on 1/25/2022 at 2:10 PM

## 2022-01-25 NOTE — PROGRESS NOTES
[facility] Dermatology Note  Encounter Date: Jan 25, 2022  Office Visit     Dermatology Problem List:  1. Mild hidradenitis suppurativa vs recurrent furunculosis - axilla, groin. Favor HS due to history of pilonidal cyst.  - 10/28/20 ILK to R groin, 8/25/21 L groin, 1/25/22 R groin   - Current tx: spironolactone 100 mg every day (started 12/2021), Hibiclens 4% wash daily, Clindamycin 1% BID when flares, birth control (depo inj)  - Previous tx: doxycycline 100mg po BID x10 days with flares, BPO 5% wash, hx of I&D   - Future considerations: Humira, isotretinoin   2. Dermatitis - bilateral areolae - desonide 0.05% cream  3. Intertrigo -  hydrocortisone 2.5% cream, keto 2% cream  4. Hypopigmented macule, possible PIH, L thigh -  hydrocortisone 2.5% cream     Social: Would like liposuction, but unable to schedule due to ongoing open lesions 2/2 HS   ____________________________________________    Assessment & Plan:    # Hidradenitis suppurativa, Lopez stage I. Flare today in the right groin.    Patient here for kenalog injection to two spots on the groin area.  See procedure note below  - ILK 10 0.5 ml to two spots   - continue hibiclens wash 2-3x weekly for prevention  - continue clindamycin lotion BID prn  - continue spironolactone 100 mg daily if tolerated  - scheduled to start LHR in March with Dr. Junior   - future considerations: Humira, isotretinoin    Procedures Performed:   - Intra-lesional triamcinolone procedure note. After positioning and cleansing with isopropyl alcohol, 0.5 total mL of triamcinolone 10 mg/mL was injected into 2 lesion(s) on the right groin. The patient tolerated the procedure well and left the dermatology clinic in good condition.    Follow-up: next week in [facility]    Staff and Scribe:     Irene Nelson MD     The patient was seen and staffed with Dr. Miguel A MD     Staff Physician Comments:   I saw and evaluated the patient with the resident and I  agree with the assessment and plan.  I was present for the entire minor procedure and examination.    Jessica Grady MD    Department of Dermatology  [facility]: Phone: [phone], Fax: [phone]  1/25/2022       ____________________________________________    CC: Derm Problem (HS flare in the groin area, started about 3 days ago)    HPI:  Ms. Queenie Rutledge is a(n) 36 year old female who presents today as a return patient for HS flare. The patient was last seen in dermatology on 1/11/22 by Dr. Telles at which time she received a consult for Ndyag laser treatment to be started in March. She continues on spironolactone, hibiclens, clinda lotion as above.  She has been well controlled until now.  She would like ILK injection for flaring lesions on the right groin x2.     Patient is otherwise feeling well, without additional skin concerns.    Labs Reviewed:  N/A    Physical Exam:  Vitals: There were no vitals taken for this visit.  SKIN: Focused examination of groin was performed.  - one large inflammatory nodule on the right inguinal fold, one smaller inflammatory nodule inferior right groin   - No other lesions of concern on areas examined.     Medications:  Current Outpatient Medications   Medication     benzoyl peroxide 5 % external liquid     chlorhexidine (HIBICLENS) 4 % liquid     clindamycin (CLEOCIN T) 1 % external lotion     hydrocortisone 2.5 % ointment     hydrocortisone, Perianal, (HYDROCORTISONE) 2.5 % cream     MedroxyPROGESTERone Acetate (DEPO-PROVERA IM)     spironolactone (ALDACTONE) 100 MG tablet     No current facility-administered medications for this visit.      Past Medical History:   Patient Active Problem List   Diagnosis     Glaucoma suspect     Glaucoma suspect, bilateral     Past Medical History:   Diagnosis Date     Glaucoma         CC Referred Self, MD  No address on file on close of  this encounter.

## 2022-01-25 NOTE — NURSING NOTE
Drug Administration Record    Prior to injection, verified patient identity using patient's name and date of birth.  Due to injection administration, patient instructed to remain in clinic for 15 minutes  afterwards, and to report any adverse reaction to me immediately.    Drug Name: triamcinolone acetonide(kenalog)  Dose: 0.5mL of triamcinolone 10mg/mL, 5mg dose  Route administered: ID  NDC #: Kenalog-10 (5079-5959-62)  Amount of waste(mL):4.5mL  Reason for waste: Single use vial    LOT #: [id number]  SITE: see provider note  : [employer]  EXPIRATION DATE: 04/2023

## 2022-01-25 NOTE — LETTER
1/25/2022       RE: Queenie Rutledge  [address] [address]     Dear Colleague,    Thank you for referring your patient, Queenie Rutledge, to the [facility] DERMATOLOGY [facility]INIC [place] at [facility]. Please see a copy of my visit note below.    [facility] Dermatology Note  Encounter Date: Jan 25, 2022  Office Visit     Dermatology Problem List:  1. Mild hidradenitis suppurativa vs recurrent furunculosis - axilla, groin. Favor HS due to history of pilonidal cyst.  - 10/28/20 ILK to R groin, 8/25/21 L groin, 1/25/22 R groin   - Current tx: spironolactone 100 mg every day (started 12/2021), Hibiclens 4% wash daily, Clindamycin 1% BID when flares, birth control (depo inj)  - Previous tx: doxycycline 100mg po BID x10 days with flares, BPO 5% wash, hx of I&D   - Future considerations: Humira, isotretinoin   2. Dermatitis - bilateral areolae - desonide 0.05% cream  3. Intertrigo -  hydrocortisone 2.5% cream, keto 2% cream  4. Hypopigmented macule, possible PIH, L thigh -  hydrocortisone 2.5% cream     Social: Would like liposuction, but unable to schedule due to ongoing open lesions 2/2 HS   ____________________________________________    Assessment & Plan:    # Hidradenitis suppurativa, Lopez stage I. Flare today in the right groin.    Patient here for kenalog injection to two spots on the groin area.  See procedure note below  - ILK 10 0.5 ml to two spots   - continue hibiclens wash 2-3x weekly for prevention  - continue clindamycin lotion BID prn  - continue spironolactone 100 mg daily if tolerated  - scheduled to start LHR in March with Dr. Junior   - future considerations: Humira, isotretinoin    Procedures Performed:   - Intra-lesional triamcinolone procedure note. After positioning and cleansing with isopropyl alcohol, 0.5 total mL of triamcinolone 10 mg/mL was injected into 2 lesion(s) on the right groin. The patient  tolerated the procedure well and left the dermatology clinic in good condition.    Follow-up: next week in [facility]    Staff and Scribe:     Irene Nelson MD     The patient was seen and staffed with Dr. Miguel A MD     Staff Physician Comments:   I saw and evaluated the patient with the resident and I agree with the assessment and plan.  I was present for the entire minor procedure and examination.    Jessica Grady MD    Department of Dermatology  [facility]: Phone: [phone], Fax: [phone]  1/25/2022       ____________________________________________    CC: Derm Problem (HS flare in the groin area, started about 3 days ago)    HPI:  Ms. Queenie Rutledge is a(n) 36 year old female who presents today as a return patient for HS flare. The patient was last seen in dermatology on 1/11/22 by Dr. Telles at which time she received a consult for Ndyag laser treatment to be started in March. She continues on spironolactone, hibiclens, clinda lotion as above.  She has been well controlled until now.  She would like ILK injection for flaring lesions on the right groin x2.     Patient is otherwise feeling well, without additional skin concerns.    Labs Reviewed:  N/A    Physical Exam:  Vitals: There were no vitals taken for this visit.  SKIN: Focused examination of groin was performed.  - one large inflammatory nodule on the right inguinal fold, one smaller inflammatory nodule inferior right groin   - No other lesions of concern on areas examined.     Medications:  Current Outpatient Medications   Medication     benzoyl peroxide 5 % external liquid     chlorhexidine (HIBICLENS) 4 % liquid     clindamycin (CLEOCIN T) 1 % external lotion     hydrocortisone 2.5 % ointment     hydrocortisone, Perianal, (HYDROCORTISONE) 2.5 % cream     MedroxyPROGESTERone Acetate (DEPO-PROVERA IM)     spironolactone (ALDACTONE) 100  MG tablet     No current facility-administered medications for this visit.      Past Medical History:   Patient Active Problem List   Diagnosis     Glaucoma suspect     Glaucoma suspect, bilateral     Past Medical History:   Diagnosis Date     Glaucoma         CC Referred Self, MD  No address on file on close of this encounter.

## 2022-02-03 ENCOUNTER — OFFICE VISIT (OUTPATIENT)
Dept: DERMATOLOGY | Facility: CLINIC | Age: 37
End: 2022-02-03
Payer: COMMERCIAL

## 2022-02-03 DIAGNOSIS — L73.2 HIDRADENITIS SUPPURATIVA: Primary | ICD-10-CM

## 2022-02-03 PROCEDURE — 11900 INJECT SKIN LESIONS </W 7: CPT | Mod: GC

## 2022-02-03 ASSESSMENT — PAIN SCALES - GENERAL: PAINLEVEL: NO PAIN (0)

## 2022-02-03 NOTE — PROGRESS NOTES
[facility] Dermatology Note  Encounter Date: Feb 3, 2022  Office Visit     Dermatology Problem List:  *ISABELA CC*  1. Mild hidradenitis suppurativa vs recurrent furunculosis - axilla, groin. Favor HS due to history of pilonidal cyst.  - 10/28/20 ILK to R groin, 8/25/21 L groin, 1/25/22 & 2/3/22 R groin   - Current tx: spironolactone 100 mg every day (started 12/2021), Hibiclens 4% wash daily, Clindamycin 1% BID when flares, birth control (depo inj)  - Previous tx: doxycycline 100mg po BID x10 days with flares, BPO 5% wash, hx of I&D   - Future considerations: Humira, isotretinoin, excision  2. Dermatitis - bilateral areolae - desonide 0.05% cream  3. Intertrigo -  hydrocortisone 2.5% cream, keto 2% cream  4. Hypopigmented macule, possible PIH, L thigh -  hydrocortisone 2.5% cream     Social: Would like liposuction, but unable to schedule due to ongoing open lesions 2/2 HS   ____________________________________________    Assessment & Plan:    # Hidradenitis suppurativa, Lopez stage I. Flare today in the right groin, improved since injections 1 week ago but still not fully healed.    Patient here for kenalog injection to two spots on the R groin area and 1 on the L groin area.  See procedure note below  - ILK 10 1 ml to 3 spots   - Vaseline and non adhesive dressing until the skin heels on the right groin   - continue hibiclens wash 2-3x weekly for prevention  - continue clindamycin lotion BID prn  - continue spironolactone 100 mg daily if tolerated  - scheduled to start LHR in March with Dr. Junior   - future considerations: Humira, isotretinoin, excision     Procedures Performed:   - Intra-lesional triamcinolone procedure note. After positioning and cleansing with isopropyl alcohol, 1 total mL of triamcinolone 10 mg/mL was injected into 3 lesion(s) on the right & left groin. The patient tolerated the procedure well and left the dermatology clinic in good condition.    Follow-up: 8 weeks  Omar CC     Staff/Resident:     Irene Nelson MD     The patient was seen and staffed with Dr. Gabbie MD     I, Cuca Cartwright MD, saw this patient with the resident and agree with the resident s findings and plan of care as documented in the resident s note.  Injections reviewed with resident.     ____________________________________________    CC: Derm Problem (pt states is here for follow up HS, sx are better e)    HPI:  Ms. Queenie Rutledge is a(n) 36 year old female who presents today as a return patient for HS flare. Seen 1/25/22 w/ Dr. Grady for flare, at that time injected 0.5 ml kenalog into 2 lesions on the right groin.  Improved since then, however patient is having new flare of the left groin x1 and is having difficulty with the bandages on the right groin. She continues on spironolactone, hibiclens, clinda lotion as above.  She would like ILK injection for flaring lesions on the right groin x2 and x1 on the left groin .     Patient is otherwise feeling well, without additional skin concerns.    Labs Reviewed:  N/A    Physical Exam:  Vitals: There were no vitals taken for this visit.  SKIN: Focused examination of groin was performed.  - one large inflammatory nodule on the right inguinal fold, one smaller inflammatory nodule inferior right groin and one in the left groin   - No other lesions of concern on areas examined.     Medications:  Current Outpatient Medications   Medication     benzoyl peroxide 5 % external liquid     chlorhexidine (HIBICLENS) 4 % liquid     clindamycin (CLEOCIN T) 1 % external lotion     hydrocortisone 2.5 % ointment     hydrocortisone, Perianal, (HYDROCORTISONE) 2.5 % cream     MedroxyPROGESTERone Acetate (DEPO-PROVERA IM)     spironolactone (ALDACTONE) 100 MG tablet     No current facility-administered medications for this visit.      Past Medical History:   Patient Active Problem List   Diagnosis     Glaucoma suspect     Glaucoma suspect, bilateral      Past Medical History:   Diagnosis Date     Glaucoma         CC Referred Self, MD  No address on file on close of this encounter.

## 2022-02-03 NOTE — LETTER
2/3/2022       RE: Queenie Rutledge  [address] [address]     Dear Colleague,    Thank you for referring your patient, Queenie Rutledge, to the [facility] DERMATOLOGY CLINIC [place] at [facility]. Please see a copy of my visit note below.    [facility] Dermatology Note  Encounter Date: Feb 3, 2022  Office Visit     Dermatology Problem List:  *ISABELA CC*  1. Mild hidradenitis suppurativa vs recurrent furunculosis - axilla, groin. Favor HS due to history of pilonidal cyst.  - 10/28/20 ILK to R groin, 8/25/21 L groin, 1/25/22 & 2/3/22 R groin   - Current tx: spironolactone 100 mg every day (started 12/2021), Hibiclens 4% wash daily, Clindamycin 1% BID when flares, birth control (depo inj)  - Previous tx: doxycycline 100mg po BID x10 days with flares, BPO 5% wash, hx of I&D   - Future considerations: Humira, isotretinoin, excision  2. Dermatitis - bilateral areolae - desonide 0.05% cream  3. Intertrigo -  hydrocortisone 2.5% cream, keto 2% cream  4. Hypopigmented macule, possible PIH, L thigh -  hydrocortisone 2.5% cream     Social: Would like liposuction, but unable to schedule due to ongoing open lesions 2/2 HS   ____________________________________________    Assessment & Plan:    # Hidradenitis suppurativa, Lopez stage I. Flare today in the right groin, improved since injections 1 week ago but still not fully healed.    Patient here for kenalog injection to two spots on the R groin area and 1 on the L groin area.  See procedure note below  - ILK 10 1 ml to 3 spots   - Vaseline and non adhesive dressing until the skin heels on the right groin   - continue hibiclens wash 2-3x weekly for prevention  - continue clindamycin lotion BID prn  - continue spironolactone 100 mg daily if tolerated  - scheduled to start LHR in March with Dr. Junior   - future considerations: Humira, isotretinoin, excision     Procedures  Performed:   - Intra-lesional triamcinolone procedure note. After positioning and cleansing with isopropyl alcohol, 1 total mL of triamcinolone 10 mg/mL was injected into 3 lesion(s) on the right & left groin. The patient tolerated the procedure well and left the dermatology clinic in good condition.    Follow-up: 8 weeks Omar GOODMAN     Staff/Resident:     Irene Nelson MD     The patient was seen and staffed with MD HALLE Álvarez, Cuca Cartwright MD, saw this patient with the resident and agree with the resident s findings and plan of care as documented in the resident s note.  Injections reviewed with resident.     ____________________________________________    CC: Derm Problem (pt states is here for follow up HS, sx are better e)    HPI:  Ms. Queenie Rutledge is a(n) 36 year old female who presents today as a return patient for HS flare. Seen 1/25/22 w/ Dr. Grady for flare, at that time injected 0.5 ml kenalog into 2 lesions on the right groin.  Improved since then, however patient is having new flare of the left groin x1 and is having difficulty with the bandages on the right groin. She continues on spironolactone, hibiclens, clinda lotion as above.  She would like ILK injection for flaring lesions on the right groin x2 and x1 on the left groin .     Patient is otherwise feeling well, without additional skin concerns.    Labs Reviewed:  N/A    Physical Exam:  Vitals: There were no vitals taken for this visit.  SKIN: Focused examination of groin was performed.  - one large inflammatory nodule on the right inguinal fold, one smaller inflammatory nodule inferior right groin and one in the left groin   - No other lesions of concern on areas examined.     Medications:  Current Outpatient Medications   Medication     benzoyl peroxide 5 % external liquid     chlorhexidine (HIBICLENS) 4 % liquid     clindamycin (CLEOCIN T) 1 % external lotion     hydrocortisone 2.5 % ointment     hydrocortisone,  Perianal, (HYDROCORTISONE) 2.5 % cream     MedroxyPROGESTERone Acetate (DEPO-PROVERA IM)     spironolactone (ALDACTONE) 100 MG tablet     No current facility-administered medications for this visit.      Past Medical History:   Patient Active Problem List   Diagnosis     Glaucoma suspect     Glaucoma suspect, bilateral     Past Medical History:   Diagnosis Date     Glaucoma         CC Referred Self, MD  No address on file on close of this encounter.

## 2022-03-01 ENCOUNTER — TELEPHONE (OUTPATIENT)
Dept: DERMATOLOGY | Facility: CLINIC | Age: 37
End: 2022-03-01
Payer: COMMERCIAL

## 2022-03-01 NOTE — TELEPHONE ENCOUNTER
[facility] Call Center    Phone Message    May a detailed message be left on voicemail: yes     Reason for Call: Other: Please call Pt back - she has a procedure this week and needs to know for sure what time to check in - because someone told her different times and she thought it was 30 mins early - also needs to know if any restrictions ahead of time - please return her call - Thanks      Action Taken: Message routed to:  [facility] ([facility]    Travel Screening: Not Applicable

## 2022-03-01 NOTE — TELEPHONE ENCOUNTER
Queenie returned call and had questions about her appointment time and prep.  I advised her to arrive at 12:45.    Prep instructions:  1. Shave night before or morning of procedure.  2. Avoid plucking, waxing, or electrolysis 4 weeks before.  3. Strict sun avoidance 6 weeks prior to and 6 weeks after treatment.    She verbalized understanding.  Tamara Stephens RN

## 2022-03-01 NOTE — TELEPHONE ENCOUNTER
Left message to call back clinic regarding appointment 3/3.   Krystina Lee, CMA on 3/1/2022 at 9:54 AM

## 2022-03-03 ENCOUNTER — TELEPHONE (OUTPATIENT)
Dept: DERMATOLOGY | Facility: CLINIC | Age: 37
End: 2022-03-03

## 2022-03-03 ENCOUNTER — OFFICE VISIT (OUTPATIENT)
Dept: DERMATOLOGY | Facility: CLINIC | Age: 37
End: 2022-03-03
Payer: COMMERCIAL

## 2022-03-03 DIAGNOSIS — L73.2 HIDRADENITIS SUPPURATIVA: Primary | ICD-10-CM

## 2022-03-03 PROCEDURE — 17999 UNLISTD PX SKN MUC MEMB SUBQ: CPT | Performed by: DERMATOLOGY

## 2022-03-03 NOTE — NURSING NOTE
LMX (approx. 7 gms) applied to the inguinal creases.     NDC: 84552-203-17  Exp: 09/2023    Rosamaria Diez LPN

## 2022-03-03 NOTE — PROCEDURES
Laser Hair Removal Gentle Max Prox (1064nm)  Procedure Date: Mar 3, 2022    Staff Surgeon: Dr. Telles    Resident Surgeon: none    Assistant: Veronica    Trimming required prior to treatment in clinic?: Yes, patient shaving prior.    [place] skin type: IV-V  Diagnosis/Treatment Reason: Hidradenitis supparativa    Treatment#: 1     Typical HS settings 10-mm spot size, 20-ms pulse duration, and 25 to 40 J/cm2 in patients with skintypes IV to VI    Wavelength(755/1064nm):1064   Location: inguinal creases  Fluence: 25  Spot size: 10  Pulse width:20 mS ( mS)   Total Number of Pulses: 99  Dynamic cooling spray settin mS  Dynamic cooling device delay:  20 mS         Anesthesia:  See nursing record, topical    Walt used?:No  Ice used?: Yes    Description of Operation/Procedure:   For new laser patients: The nature and purpose of the procedure, associated risks, possible consequences, complications and alternative methods of treatment were explained in detail, this includes but is not limited to hyperpigmentation, hypopigmentation, scarring, bruising, hair loss pain/discomfort, eye injury, paradoxical hair growth, hives, infection, itching  and blister. Primary goal here is improvement in HS rather than hair reduction.  We reviewed that the outcome could be any of the following: no improvement, slight improvement or change in skin color & texture, the skin might be permanently lighter or darker, and though uncommon, superficial scarring may occur.  Multiple treatments are required. We reviewed with patients that laser hair removal is a reduction in hair and not permanent. The risks were again reviewed including skin hyperpigmentation, hypopigmentation, scarring, bruising blistering. Reviewed white and red hair do not respond. Reviewed hair may regrow as it is not permanent but a reduction. Reviewed risks of paradoxical hypertrichosis. . Reviewed that the patient can terminate the procedure at anytime.       Operative consent were obtained. Time-out was performed.  The patient was positioned to optimally expose the area treated. Dynamic cooling was verified and functioning properly.  Protective eyewear was worn by the patient and goggles on all personnel in the treatment room.  The patient confirmed the site to be treated. The laser energy output was verified by meter reading.  N95 and buffalo filtration was used.     The clinically evident lesion(s) was/were treated with laser beam as above with 1 pass.  The patient tolerated the procedure well and no complications were noted. Post operative instructions were provided. The patient was counseled to contact us immediately for any concerns. The patient was offered and recommended to read their after visit summary.     We reviewed the need to use the phone for any emergencies due the the mychart delay    Bill to insurance (LHR inguinal creases).    The patient will follow-up in 4-12 weeks        Staff Involved:  Staff Only      Veronica Telles MD    Department of Dermatology  [facility] [place]: Phone: [phone], Fax:[phone]  [facility]: Phone: [phone], Fax: [phone]

## 2022-03-03 NOTE — NURSING NOTE
Queenie Rutledge's goals for this visit include:   Chief Complaint   Patient presents with     Procedure     Laser for HS       She requests these members of her care team be copied on today's visit information: no    PCP: Bharat Lopez    Referring Provider:  No referring provider defined for this encounter.    There were no vitals taken for this visit.    Do you need any medication refills at today's visit? No    Dermatology Laser Intake Checklist:  History of psoriasis: No  History of recent tan, indoor or outdoor tanning/vacation or other sun exposure: No  History of vitiligo: No  Family history of vitiligo: No  Recent other cosmetic procedure(microderm abrasion/peel/hair removal/facial etc): No  History of HSV: No  Did the patient start valtrex: No  For genital laser hair removal patient only: Is there a history of genital warts or condyloma: No  Tattoo in the area to be treated: No  Is patient using hydroquinone: No  Retinoids and other acne medications stopped for 2 weeks: No  Has the patient had accutane in the last 6-12 months: No  Pregnant or breastfeeding: No  History of skin cancer in area planned for treatment: No  History of treatment with gold: No  Changes in medical history: No  Photos obtained: No  Does the patient smoke: No  Is the patient on ibuprofen/aspirin/plavix/coumadin/other blood thinner: No  If patient is taking narcotic or diazepam(valium)-does patient have : No  There were no vitals taken for this visit.     Rosamaria Diez LPN

## 2022-03-03 NOTE — TELEPHONE ENCOUNTER
Reaching out to patient to schedule a series of [facility] appointments.     No answer. Left message for patient to return call.

## 2022-03-03 NOTE — LETTER
3/3/2022         RE: Queenie Rutlegde  [address] [address]        Dear Colleague,    Thank you for referring your patient, Queenie Rutledge, to the [facility]. Please see a copy of my visit note below.    See procedure note.      Again, thank you for allowing me to participate in the care of your patient.        Sincerely,        Veronica Telles MD

## 2022-03-31 ENCOUNTER — OFFICE VISIT (OUTPATIENT)
Dept: DERMATOLOGY | Facility: CLINIC | Age: 37
End: 2022-03-31
Payer: COMMERCIAL

## 2022-03-31 DIAGNOSIS — R19.09 NODULE OF GROIN: ICD-10-CM

## 2022-03-31 DIAGNOSIS — L73.2 HIDRADENITIS SUPPURATIVA: Primary | ICD-10-CM

## 2022-03-31 PROCEDURE — 11900 INJECT SKIN LESIONS </W 7: CPT | Mod: GC | Performed by: DERMATOLOGY

## 2022-03-31 ASSESSMENT — PAIN SCALES - GENERAL: PAINLEVEL: NO PAIN (0)

## 2022-03-31 NOTE — NURSING NOTE
Drug Administration Record    Prior to injection, verified patient identity using patient's name and date of birth.  Due to injection administration, patient instructed to remain in clinic for 15 minutes  afterwards, and to report any adverse reaction to me immediately.    Drug Name: triamcinolone acetonide(kenalog)  Dose: 1mL of triamcinolone 10mg/mL, 10mg dose  Route administered: ID  NDC #: Kenalog-10 (5975-2267-86)  Amount of waste(mL):4mL  Reason for waste: Multi dose vial    LOT #: [id number]  SITE: see provider note  : [employer]  EXPIRATION DATE: 05/2023     Patient has switched clinics to [facility] as he has [employer] insurance.  Please do not call him for any more outreach calls

## 2022-03-31 NOTE — LETTER
3/31/2022       RE: Queenie Rutledge  [address] [facility] [address]     Dear Colleague,    Thank you for referring your patient, Queenie Rutledge, to the [facility] DERMATOLOGY CLINIC [place] at [facility]. Please see a copy of my visit note below.    [facility] Dermatology Note  Encounter Date: Mar 31, 2022  Office Visit     Dermatology Problem List:  *ISABELA CC*  1. Mild hidradenitis suppurativa vs recurrent furunculosis - axilla, groin. Favor HS due to history of pilonidal cyst.  - 10/28/20 ILK to R groin, 8/25/21 L groin, 1/25/22 & 2/3/22 R groin, 3/31/22  - Current tx: spironolactone 100 mg every day (started 12/2021), Hibiclens 4% wash daily, Clindamycin 1% BID when flares, birth control (depo inj)  - Previous tx: doxycycline 100mg po BID x10 days with flares, BPO 5% wash, hx of I&D   - Future considerations: Humira, isotretinoin, excision  2. Dermatitis - bilateral areolae - desonide 0.05% cream  3. Intertrigo -  hydrocortisone 2.5% cream, keto 2% cream  4. Hypopigmented macule, possible PIH, L thigh -  hydrocortisone 2.5% cream     Social: Would like liposuction, but unable to schedule due to ongoing open lesions 2/2 HS   ____________________________________________    Assessment & Plan:    # Hidradenitis suppurativa, Lopez stage I. Flare today in the right groin and left axilla.  Injections as below to two spots.  See procedure note below  - ILK 10 1 ml to 2 spots  - continue hibiclens wash 2-3x weekly for prevention  - continue clindamycin lotion BID prn  - continue spironolactone 100 mg daily  - LHR with Dr. Valencia in April, would also like to add axilla   - future considerations: Humira, isotretinoin, excision     Procedures Performed:   - Intra-lesional triamcinolone procedure note. After positioning and cleansing with isopropyl alcohol, 1 total mL of triamcinolone 10 mg/mL was injected into 2 lesion(s) on the  "right and left axillae. The patient tolerated the procedure well and left the dermatology clinic in good condition.    Follow-up: May 19th [facility]     Staff/Resident:     Irene Nelson MD     The patient was seen and staffed with Dr. Sharan MD       ____________________________________________    CC: Derm Problem (HS - \"doing well right now.\")    HPI:  Ms. Queenie Rutledge is a(n) 37 year old female who presents today as a return patient for HS flare. Seen last in February with me for flare.  Today has two spots, concerning that one new one is in the axillae where she has not had much involvement previously.  She is not able to wear deoderant because of the irritation.  LHR going well, she would like to start doing LHR for axilla and stave off any new involvement of this area.  She continues on spironolactone, hibiclens, clinda lotion as above.  She would like ILK injection for flaring lesions on the right groin x1 and left axillae x1.     Patient is otherwise feeling well, without additional skin concerns.    Labs Reviewed:  N/A    Physical Exam:  Vitals: There were no vitals taken for this visit.  SKIN: Focused examination of groin was performed.  - one large inflammatory nodule on the right perineal area and one on the large nodule on the left axilla.  - No other lesions of concern on areas examined.     Medications:  Current Outpatient Medications   Medication     benzoyl peroxide 5 % external liquid     chlorhexidine (HIBICLENS) 4 % liquid     clindamycin (CLEOCIN T) 1 % external lotion     hydrocortisone 2.5 % ointment     hydrocortisone, Perianal, (HYDROCORTISONE) 2.5 % cream     MedroxyPROGESTERone Acetate (DEPO-PROVERA IM)     spironolactone (ALDACTONE) 100 MG tablet     Current Facility-Administered Medications   Medication     triamcinolone acetonide (KENALOG-10) injection 10 mg      Past Medical History:   Patient Active Problem List   Diagnosis     Glaucoma suspect     Glaucoma suspect, " bilateral     Past Medical History:   Diagnosis Date     Glaucoma         CC Referred Self, MD  No address on file on close of this encounter.

## 2022-03-31 NOTE — NURSING NOTE
"Dermatology Rooming Note    Queenie Rutledge's goals for this visit include:   Chief Complaint   Patient presents with     Derm Problem     HS - \"doing well right now.\"     Vivian Turner, CMA  "

## 2022-03-31 NOTE — PROGRESS NOTES
[facility] Dermatology Note  Encounter Date: Mar 31, 2022  Office Visit     Dermatology Problem List:  *ISABELA CC*  1. Mild hidradenitis suppurativa vs recurrent furunculosis - axilla, groin. Favor HS due to history of pilonidal cyst.  - 10/28/20 ILK to R groin, 8/25/21 L groin, 1/25/22 & 2/3/22 R groin, 3/31/22  - Current tx: spironolactone 100 mg every day (started 12/2021), Hibiclens 4% wash daily, Clindamycin 1% BID when flares, birth control (depo inj)  - Previous tx: doxycycline 100mg po BID x10 days with flares, BPO 5% wash, hx of I&D   - Future considerations: Humira, isotretinoin, excision  2. Dermatitis - bilateral areolae - desonide 0.05% cream  3. Intertrigo -  hydrocortisone 2.5% cream, keto 2% cream  4. Hypopigmented macule, possible PIH, L thigh -  hydrocortisone 2.5% cream     Social: Would like liposuction, but unable to schedule due to ongoing open lesions 2/2 HS   ____________________________________________    Assessment & Plan:    # Hidradenitis suppurativa, Lopez stage I. Flare today in the right groin and left axilla.  Injections as below to two spots.  See procedure note below  - ILK 10 1 ml to 2 spots  - continue hibiclens wash 2-3x weekly for prevention  - continue clindamycin lotion BID prn  - continue spironolactone 100 mg daily  - LHR with Dr. Valencia in April, would also like to add axilla   - future considerations: Humira, isotretinoin, excision     Procedures Performed:   - Intra-lesional triamcinolone procedure note. After positioning and cleansing with isopropyl alcohol, 1 total mL of triamcinolone 10 mg/mL was injected into 2 lesion(s) on the right and left axillae. The patient tolerated the procedure well and left the dermatology clinic in good condition.    Follow-up: May 19th [facility]     Staff/Resident:     Irene Nelson MD     The patient was seen and staffed with Dr. Sharan MD     Patient was seen and examined with the dermatology  "resident. I agree with the history, review of systems, physical examination, assessments and plan. I was present for the key portion of the ILK procedure.    Zuleika Tsang MD  Professor and  Chair  Department of Dermatology  [facility]      ____________________________________________    CC: Derm Problem (HS - \"doing well right now.\")    HPI:  Ms. Queenie Rutledge is a(n) 37 year old female who presents today as a return patient for HS flare. Seen last in February  for flare.  Today has two spots, concerning that one new one is in the axillae where she has not had much involvement previously.  She is not able to wear deoderant because of the irritation.  LHR going well, she would like to start doing LHR for axilla and stave off any new involvement of this area.  She continues on spironolactone, hibiclens, clinda lotion as above.  She would like ILK injection for flaring lesions on the right groin x1 and left axillae x1.     Patient is otherwise feeling well, without additional skin concerns.    Labs Reviewed:  N/A    Physical Exam:  Vitals: There were no vitals taken for this visit.  SKIN: Focused examination of groin was performed.  - one large inflammatory nodule on the right perineal area and one on the large nodule on the left axilla.  - No other lesions of concern on areas examined.     Medications:  Current Outpatient Medications   Medication     benzoyl peroxide 5 % external liquid     chlorhexidine (HIBICLENS) 4 % liquid     clindamycin (CLEOCIN T) 1 % external lotion     hydrocortisone 2.5 % ointment     hydrocortisone, Perianal, (HYDROCORTISONE) 2.5 % cream     MedroxyPROGESTERone Acetate (DEPO-PROVERA IM)     spironolactone (ALDACTONE) 100 MG tablet     Current Facility-Administered Medications   Medication     triamcinolone acetonide (KENALOG-10) injection 10 mg      Past Medical History:   Patient Active Problem List   Diagnosis     Glaucoma suspect     Glaucoma suspect, bilateral "     Past Medical History:   Diagnosis Date     Glaucoma         CC Referred Self, MD  No address on file on close of this encounter.

## 2022-04-15 ENCOUNTER — OFFICE VISIT (OUTPATIENT)
Dept: DERMATOLOGY | Facility: CLINIC | Age: 37
End: 2022-04-15
Payer: COMMERCIAL

## 2022-04-15 DIAGNOSIS — L73.2 HIDRADENITIS SUPPURATIVA: Primary | ICD-10-CM

## 2022-04-15 PROCEDURE — 11900 INJECT SKIN LESIONS </W 7: CPT | Performed by: DERMATOLOGY

## 2022-04-15 PROCEDURE — 17999 UNLISTD PX SKN MUC MEMB SUBQ: CPT | Mod: 58 | Performed by: DERMATOLOGY

## 2022-04-15 ASSESSMENT — PAIN SCALES - GENERAL: PAINLEVEL: MODERATE PAIN (5)

## 2022-04-15 NOTE — PROCEDURES
Laser Hair Removal Gentle Max Prox (1064nm)  Procedure Date: Apr 15, 2022    Staff Surgeon: Annie    Resident Surgeon: myke    Assistant: Denisha Cho RN      Trimming required prior to treatment in clinic?: yes shaved last night  Forman skin type: IV-V  Diagnosis/Treatment Reason: HS    Treatment#: 2        Laser Settings:  Wavelength 1064  Location: inguinal creases  Fluence: 25 mj  Spot size: 10  Pulse width:  20 mS ( mS)   Total Number of Pulses: 47  Dynamic cooling spray settin mS  Dynamic cooling device delay:  30 mS    Anesthesia:  The following medication was applied to skin:    MEDICATION: 4% licodaine  ROUTE: Topical   SITE: bilateral inguinal creases   DOSE:10mg  LOT #: [id number]  : [employer]  EXPIRATION DATE: 2023  Was there drug waste? No  Multi-dose tube: No        Walt used?:No  Ice used?: Yes    Description of Operation/Procedure:     for return laser hair removal patients.The risks were again reviewed including skin hyperpigmentation, hypopigmentation, scarring, bruising, and blistering. Reviewed white and red hair do not respond. Reviewed hair may regrow as it is not permanent but a reduction in hair growth.  Reviewed that a 3 months waiting period prior to surgery to recommended. Reviewed that the patient can terminate the procedure at anytime.       A operative consent were obtained. Time-out was performed.  The patient was positioned to optimally expose the area treated. Dynamic cooling was verified and functioning properly.  Protective eyewear was worn by the patient and goggles on all personnel in the treatment room.  The patient confirmed the site to be treated. The laser energy output was verified by meter reading.  N95 and buffalo filtration was used.     The clinically evident lesion(s) was/were treated with laser beam as above with 1 pass.  The patient tolerated the procedure well and no complications were noted. Post operative instructions were  provided. The total laser operation and preparation time was 10 minutes.  The patient was counseled to contact us immediately for any concerns. The patient was offered and recommended to read their after visit summary.     Numeric pain scale after treatment: 5/10    The patient will follow-up in 6-8 weeks and is scheduled.      Dr. Valencia staffed the patient was present for identifying and marking target area(s),, ascertainment of protective equipment utilization,, calibration of laser, and treatment of the entire area(s),    Staff Involved:  Staff Only  Denisha Cho RN

## 2022-04-15 NOTE — PROGRESS NOTES
Laser Hair Removal Gentle Max Prox (1064nm)  Procedure Date: Apr 15, 2022    Staff Surgeon: Dr. Valencia    Resident Surgeon: none    Assistant: Denisha Cho RN    Trimming required prior to treatment in clinic?: Yes, patient shaving prior.    Forman skin type: IV-V  Diagnosis/Treatment Reason: Hidradenitis supparativa    Treatment#:  2    Interval history: No issues with last treatment, wants to retreat.        Wavelength(755/1064nm):1064    Location: inguinal creases  Fluence: 25  Spot size: 10  Pulse width:20 mS ( mS)   Total Number of Pulses: 47  Dynamic cooling spray settin mS  Dynamic cooling device delay:  30 mS         Anesthesia:  See nursing record, topical    Walt used?:No  Ice used?: Yes    Description of Operation/Procedure:    The nature and purpose of the procedure, associated risks, possible consequences, complications and alternative methods of treatment were explained in detail, this includes but is not limited to hyperpigmentation, hypopigmentation, scarring, bruising, hair loss pain/discomfort, eye injury, paradoxical hair growth, hives, infection, itching  and blister. Primary goal here is improvement in HS rather than hair reduction.  We reviewed that the outcome could be any of the following: no improvement, slight improvement or change in skin color & texture, the skin might be permanently lighter or darker, and though uncommon, superficial scarring may occur.  Multiple treatments are required.     Operative consent were obtained. Time-out was performed.  The patient was positioned to optimally expose the area treated. Dynamic cooling was verified and functioning properly.  Protective eyewear was worn by the patient and goggles on all personnel in the treatment room.  The patient confirmed the site to be treated. The laser energy output was verified by meter reading.  N95 and buffalo filtration was used.     The clinically evident lesion(s) was/were treated with laser beam as  above with 1 pass.  The patient tolerated the procedure well and no complications were noted. Post operative instructions were provided. The patient was counseled to contact us immediately for any concerns. The patient was offered and recommended to read their after visit summary.     We reviewed the need to use the phone for any emergencies due the the mychart delay    Bill to insurance (LHR inguinal creases).    The patient will follow-up in 4-12 weeks and ADD Axilla at to he follow up    After procedure one active HS lesion on the 1 active was treated with  Kenalog    Kenalog intralesional injection procedure note: After verbal consent and discussion of risks including but not limited to atrophy, pain  time out was performed, the patient underwent positioning, 0.2 total cc of Kenalog 10 mg/cc was injected into 1 sites on the right groin.  The patient tolerated the procedure well and left the Dermatology clinic in good condition.          Staff Involved:  Staff /Scribe/RN    Scribe Disclosure:   I, Robert Mckinnon, am serving as a scribe to document services personally performed by this physician, Dr. Christa Valencia, based on data collection and the provider's statements to me.       Provider Disclosure:   The documentation recorded by the scribe accurately reflects the services I personally performed and the decisions made by me.    Christa Valencia MD    Department of Dermatology  [facility]: Phone: [phone], Fax:[phone]  [facility]: Phone: [phone], Fax: [phone]

## 2022-04-15 NOTE — NURSING NOTE
Dermatology Laser Intake Checklist:  History of psoriasis: No  History of recent tan, indoor or outdoor tanning/vacation or other sun exposure: No  History of vitiligo: pt notes possible? She had a white spot in the past that resolved within the last 6 months in groin area.    Family history of vitiligo: No  Recent other cosmetic procedure(microderm abrasion/peel/hair removal/facial etc): No  History of HSV: No  Did the patient start valtrex: N/A  For genital laser hair removal patient only: Is there a history of genital warts or condyloma: No  Tattoo in the area to be treated: No  Is patient using hydroquinone: No  Retinoids and other acne medications stopped for 2 weeks: No  Has the patient had accutane in the last 6-12 months: No  Pregnant or breastfeeding: N/A  History of skin cancer in area planned for treatment: No  History of treatment with gold: No  Changes in medical history: No  Photos obtained: No  Does the patient smoke: No  Is the patient on ibuprofen/aspirin/plavix/coumadin/other blood thinner: No  If patient is taking narcotic or diazepam(valium)-does patient have : No  Denisha Cho RN

## 2022-04-15 NOTE — LETTER
4/15/2022         RE: Queenie Rutledge  [address] [facility] [address]        Dear Colleague,    Thank you for referring your patient, Queenie Rutledge, to the [facility]. Please see a copy of my visit note below.    Laser Hair Removal Gentle Max Prox (1064nm)  Procedure Date: Apr 15, 2022    Staff Surgeon: Dr. Valencia    Resident Surgeon: none    Assistant: Denisha Cho RN    Trimming required prior to treatment in clinic?: Yes, patient shaving prior.    Forman skin type: IV-V  Diagnosis/Treatment Reason: Hidradenitis supparativa    Treatment#:  2    Interval history: No issues with last treatment, wants to retreat.        Wavelength(755/1064nm):1064    Location: inguinal creases  Fluence: 25  Spot size: 10  Pulse width:20 mS ( mS)   Total Number of Pulses: 47  Dynamic cooling spray settin mS  Dynamic cooling device delay:  30 mS         Anesthesia:  See nursing record, topical    Walt used?:No  Ice used?: Yes    Description of Operation/Procedure:    The nature and purpose of the procedure, associated risks, possible consequences, complications and alternative methods of treatment were explained in detail, this includes but is not limited to hyperpigmentation, hypopigmentation, scarring, bruising, hair loss pain/discomfort, eye injury, paradoxical hair growth, hives, infection, itching  and blister. Primary goal here is improvement in HS rather than hair reduction.  We reviewed that the outcome could be any of the following: no improvement, slight improvement or change in skin color & texture, the skin might be permanently lighter or darker, and though uncommon, superficial scarring may occur.  Multiple treatments are required.     Operative consent were obtained. Time-out was performed.  The patient was positioned to optimally expose the area treated. Dynamic cooling was verified and functioning properly.  Protective eyewear was worn by the patient and  goggles on all personnel in the treatment room.  The patient confirmed the site to be treated. The laser energy output was verified by meter reading.  N95 and buffalo filtration was used.     The clinically evident lesion(s) was/were treated with laser beam as above with 1 pass.  The patient tolerated the procedure well and no complications were noted. Post operative instructions were provided. The patient was counseled to contact us immediately for any concerns. The patient was offered and recommended to read their after visit summary.     We reviewed the need to use the phone for any emergencies due the the mychart delay    Bill to insurance (LHR inguinal creases).    The patient will follow-up in 4-12 weeks and ADD Axilla at to he follow up    After procedure one active HS lesion on the 1 active was treated with  Kenalog    Kenalog intralesional injection procedure note: After verbal consent and discussion of risks including but not limited to atrophy, pain  time out was performed, the patient underwent positioning, 0.2 total cc of Kenalog 10 mg/cc was injected into 1 sites on the right groin.  The patient tolerated the procedure well and left the Dermatology clinic in good condition.          Staff Involved:  Staff /Scribe/RN    Scribe Disclosure:   I, Robert Mckinnon, am serving as a scribe to document services personally performed by this physician, Dr. Christa Valencia, based on data collection and the provider's statements to me.       Provider Disclosure:   The documentation recorded by the scribe accurately reflects the services I personally performed and the decisions made by me.    Christa Valencia MD    Department of Dermatology  [facility]: Phone: [phone], Fax:[phone]  [facility]: Phone: [phone], Fax: [phone]                       Again, thank you for allowing me to  participate in the care of your patient.        Sincerely,        Christa Valencia MD

## 2022-04-15 NOTE — NURSING NOTE
Queenie MICHELLE Rutledge comes into clinic today at the request of Dr Valencia Ordering Provider for NdYag laser 1064 for HS.        This service provided today was under the supervising provider of the day Dr Valencia, who was available if needed.    Denisha Cho RN      The following medication was given:     MEDICATION: Kenalog 10 mg  ROUTE: SQ  SITE: right inguinal crease  DOSE: 0.2cc  LOT #: [id number]  :  [employer]  EXPIRATION DATE:  04/2023  NDC#: 7751-2364-42

## 2022-04-15 NOTE — PATIENT INSTRUCTIONS
Gentle Max Hidradenitis Instructions    Laser : I will have redness, pain and swelling. I may have skin discoloration, bruising and itching. Risks are permenant discoloration, hives, infection scarring, eye injury,hair growth, and blister. The outcome could be no improvement or slight improvement. Multiple treatments are required. All hair will not be removed.     Before the procedure:  Sun Protection:  Do not allow the area to become tan or come in with a tan for a treatment. Tans will increased the risks of the procedure. Do not use spray or any over counter product self tanners prior to the procedure.     Shaving:   Gently shave the area the evening before the procedure.     Other:  Avoid any retinols such as Differin, adapalene, retinol or tretinoin to the treated area 1 week prior. Hold bleaching creams such as hydroquinone until 1 week prior.  Avoid any hair removal procedures such as waxing, electrolysis or other lasers on the skin within 6 weeks prior. Do not have any other cosmetic procedures done on the area within the prior 6 weeks such as chemical peels or dermabrasion.      Post Procedure:  Day 1-7  The healing time for any given treatment varies between clients. The following represents the general recovery phases you might expect. Individual clients may experience variations from this course.     Swelling/Discomfort/Redness:  The most common side effects are erythema and edema (redness and swelling) which generally occur immediately after and typically resolve within 48 hours. If crusting and blistering occurs call the clinic.     Activity:  Avoid swimming the day of laser hair removal treatment. Also, no rigorous exercise the day of treatment.     Moisturizers and Sunscreens:  Wait until the skin has returned to normal to start topical products.     Sun Protection:  Do not allow the area to become tan or come in with a tan for a treatment. Tans will increased the risks of the procedure. Do not use  spray or any over counter product self tanners prior to the procedure.     Warning signs:  Call the clinic if you have significant pain, increasing redness, pus, blisters/crusting or for any other concerns.     Who should I call with questions?  [facility]: [phone]  [facility]: [phone]  For urgent needs outside of business hours call the [facility] at [phone] and ask for the dermatology resident on call  Mychart messaging response may be delayed by several days

## 2022-04-26 ENCOUNTER — TELEPHONE (OUTPATIENT)
Dept: DERMATOLOGY | Facility: CLINIC | Age: 37
End: 2022-04-26
Payer: COMMERCIAL

## 2022-04-26 NOTE — TELEPHONE ENCOUNTER
[facility] Call Center    Phone Message    May a detailed message be left on voicemail: yes     Reason for Call: Appointment Intake    Referring Provider Name: Dr Nelson  Diagnosis and/or Symptoms: HS injection    Per Pt Queenie she was told she could double-book. Please call to schedule HS injection this week. Pt aware of upcoming 5/19 appointment. Please call to discuss [phone]  Action Taken: Message routed to:  [facility] Derm    Travel Screening: Not Applicable

## 2022-04-27 NOTE — TELEPHONE ENCOUNTER
[facility] Call [facility]    Phone Message    May a detailed message be left on voicemail: yes     Reason for Call: Other: Pt states she left a message yesterday requesting an injection for HS this week and hasn't heard back yet. Please call Pt back to discuss. Thank you.      Action Taken: Message routed to:  [facility] Derm    Travel Screening: Not Applicable

## 2022-04-27 NOTE — TELEPHONE ENCOUNTER
Irene Nelson MD  You 53 minutes ago (2:48 PM)     SB    Yes please fit her into any resident or attending clinic.  OK to overbook my clinic If no other slots available    Message text

## 2022-04-28 ENCOUNTER — OFFICE VISIT (OUTPATIENT)
Dept: DERMATOLOGY | Facility: CLINIC | Age: 37
End: 2022-04-28
Payer: COMMERCIAL

## 2022-04-28 DIAGNOSIS — L82.0 INFLAMED SEBORRHEIC KERATOSIS: ICD-10-CM

## 2022-04-28 DIAGNOSIS — L73.2 HIDRADENITIS SUPPURATIVA: Primary | ICD-10-CM

## 2022-04-28 PROCEDURE — 17110 DESTRUCTION B9 LES UP TO 14: CPT | Mod: GC

## 2022-04-28 PROCEDURE — 11900 INJECT SKIN LESIONS </W 7: CPT | Mod: XS

## 2022-04-28 RX ORDER — CLINDAMYCIN PHOSPHATE 10 UG/ML
LOTION TOPICAL
Qty: 60 ML | Refills: 3 | Status: SHIPPED | OUTPATIENT
Start: 2022-04-28 | End: 2022-11-17

## 2022-04-28 ASSESSMENT — PAIN SCALES - GENERAL: PAINLEVEL: NO PAIN (1)

## 2022-04-28 NOTE — LETTER
4/28/2022       RE: Queenie Rutledge  [address] [facility] [address]     Dear Colleague,    Thank you for referring your patient, Queenie Rutledge, to the [facility] DERMATOLOGY CLINIC [place] at [facility]. Please see a copy of my visit note below.    [facility] Dermatology Note  Encounter Date: Apr 28, 2022  Office Visit     Dermatology Problem List:  *ISABELA CC*  1. Mild hidradenitis suppurativa vs recurrent furunculosis - axilla, groin. Favor HS due to history of pilonidal cyst.  - 10/28/20 ILK to R groin, 8/25/21 L groin, 1/25/22 & 2/3/22 R groin, 3/31/22  - Current tx: spironolactone 100 mg every day (started 12/2021), Hibiclens 4% wash daily, Clindamycin 1% BID when flares, birth control (depo inj)  - Previous tx: doxycycline 100mg po BID x10 days with flares, BPO 5% wash, hx of I&D   - Future considerations: Humira, isotretinoin, excision  2. Dermatitis - bilateral areolae - desonide 0.05% cream  3. Intertrigo -  hydrocortisone 2.5% cream, keto 2% cream  4. Hypopigmented macule, possible PIH, L thigh -  hydrocortisone 2.5% cream     Social: Would like liposuction, but unable to schedule due to ongoing open lesions 2/2 HS   ____________________________________________    Assessment & Plan:     # Hidradenitis suppurative, Lopez stage I. Flare today in the right groin, injected today with IL-K. We also revisited discussion on biologics and patient is considering starting; to be discussed further at follow-up.    - IL-K injection today (see procedure note below)   - Continue daily baths with hibiclens   - Continue spironolactone 100mg daily   - Continue clindamycin lotion BID prn (refill sent)  - Continue LHR with Dr. Valencia   - Future considerations: Humira, isotretinoin, excision     # Inflamed seborrheic keratoses. Patient is bothered by several of these lesions on her abdomen; chose one lesion along area of friction  with clothing (on right abdomen, near umbilicus) to perform cryotherapy test spot and will assess response at follow-up.  - Cryotherapy x 1 today (see procedure note below)     Procedures Performed:   - Intra-lesional triamcinolone procedure note. After positioning and cleansing with isopropyl alcohol, 1.0 total mL of triamcinolone 10 mg/mL was injected into 1 lesion(s) on the right medial thigh. The patient tolerated the procedure well and left the dermatology clinic in good condition.    Follow-up: as previously scheduled with Iain Valencia / Omar     Staff and Resident:     Lisa Morales MD PGY3     Patient was staffed with Dr. Cartwright    I was present for the entire cryotherapy procedure. Injection procedure reviewed.  Cuca RUTH I, Cuca Cartwright MD, saw this patient with the resident and agree with the resident s findings and plan of care as documented in the resident s note.    ____________________________________________    CC: Derm Problem (HS injection, states her upper thigh has been causing her skin pain, states she has an upcoming apt with Dr. Nelson for skin tags and would like to talk about that today )    HPI:  Ms. Queenie Rutledge is a(n) 37 year old female who presents today as a return patient for follow-up of hidradenitis suppurativa. She is currently experiencing a flare in the groin, with development of tender nodule on the right side that she feels may need to be injected. Not draining currently. Patient is otherwise feeling well, without additional skin concerns.    Labs Reviewed:  N/A    Physical Exam:  Vitals: There were no vitals taken for this visit.  SKIN: Focused examination of abdomen and groin was performed.  - tender inflammatory nodule on right medial thigh / inguinal fold measuring roughly 3.5 cm   - there are tan to brown waxy stuck on papules noted on abdomen, some with surrounding rim of erythema.  - no other lesions of concern on areas examined.      Medications:  Current Outpatient Medications   Medication     benzoyl peroxide 5 % external liquid     chlorhexidine (HIBICLENS) 4 % liquid     clindamycin (CLEOCIN T) 1 % external lotion     hydrocortisone 2.5 % ointment     hydrocortisone, Perianal, (HYDROCORTISONE) 2.5 % cream     MedroxyPROGESTERone Acetate (DEPO-PROVERA IM)     spironolactone (ALDACTONE) 100 MG tablet     Current Facility-Administered Medications   Medication     triamcinolone acetonide (KENALOG-10) injection 10 mg      Past Medical History:   Patient Active Problem List   Diagnosis     Glaucoma suspect     Glaucoma suspect, bilateral     Past Medical History:   Diagnosis Date     Glaucoma            Drug Administration Record    Prior to injection, verified patient identity using patient's name and date of birth.  Due to injection administration, patient instructed to remain in clinic for 15 minutes  afterwards, and to report any adverse reaction to me immediately.    Drug Name: triamcinolone acetonide(kenalog)  Dose: 1mL of triamcinolone 10mg/mL, 10mg dose  Route administered: ID  NDC #: Kenalog-10 (8072-4156-41)  Amount of waste(mL):4  Reason for waste: Multi dose vial    LOT #: [id number]  SITE: see note  : [email or web address]  EXPIRATION DATE: 9/2023

## 2022-04-28 NOTE — PROGRESS NOTES
[facility] Dermatology Note  Encounter Date: Apr 28, 2022  Office Visit     Dermatology Problem List:  *ISABELA CC*  1. Mild hidradenitis suppurativa vs recurrent furunculosis - axilla, groin. Favor HS due to history of pilonidal cyst.  - 10/28/20 ILK to R groin, 8/25/21 L groin, 1/25/22 & 2/3/22 R groin, 3/31/22  - Current tx: spironolactone 100 mg every day (started 12/2021), Hibiclens 4% wash daily, Clindamycin 1% BID when flares, birth control (depo inj)  - Previous tx: doxycycline 100mg po BID x10 days with flares, BPO 5% wash, hx of I&D   - Future considerations: Humira, isotretinoin, excision  2. Dermatitis - bilateral areolae - desonide 0.05% cream  3. Intertrigo -  hydrocortisone 2.5% cream, keto 2% cream  4. Hypopigmented macule, possible PIH, L thigh -  hydrocortisone 2.5% cream     Social: Would like liposuction, but unable to schedule due to ongoing open lesions 2/2 HS   ____________________________________________    Assessment & Plan:     # Hidradenitis suppurative, Lopez stage I. Flare today in the right groin, injected today with IL-K. We also revisited discussion on biologics and patient is considering starting; to be discussed further at follow-up.    - IL-K injection today (see procedure note below)   - Continue daily baths with hibiclens   - Continue spironolactone 100mg daily   - Continue clindamycin lotion BID prn (refill sent)  - Continue LHR with Dr. Valencia   - Future considerations: Humira, isotretinoin, excision     # Inflamed seborrheic keratoses. Patient is bothered by several of these lesions on her abdomen; chose one lesion along area of friction with clothing (on right abdomen, near umbilicus) to perform cryotherapy test spot and will assess response at follow-up.  - Cryotherapy x 1 today (see procedure note below)     Procedures Performed:   - Intra-lesional triamcinolone procedure note. After positioning and cleansing with isopropyl alcohol, 1.0 total mL of  triamcinolone 10 mg/mL was injected into 1 lesion(s) on the right medial thigh. The patient tolerated the procedure well and left the dermatology clinic in good condition.    Follow-up: as previously scheduled with Iain Valencia / Omar     Staff and Resident:     Lisa Morales MD PGY3     Patient was staffed with Dr. Cartwright    I was present for the entire cryotherapy procedure. Injection procedure reviewed.  Cuca RUTH I, Cuca Cartwright MD, saw this patient with the resident and agree with the resident s findings and plan of care as documented in the resident s note.    ____________________________________________    CC: Derm Problem (HS injection, states her upper thigh has been causing her skin pain, states she has an upcoming apt with Dr. Nelson for skin tags and would like to talk about that today )    HPI:  Ms. Queenie Rutledge is a(n) 37 year old female who presents today as a return patient for follow-up of hidradenitis suppurativa. She is currently experiencing a flare in the groin, with development of tender nodule on the right side that she feels may need to be injected. Not draining currently. Patient is otherwise feeling well, without additional skin concerns.    Labs Reviewed:  N/A    Physical Exam:  Vitals: There were no vitals taken for this visit.  SKIN: Focused examination of abdomen and groin was performed.  - tender inflammatory nodule on right medial thigh / inguinal fold measuring roughly 3.5 cm   - there are tan to brown waxy stuck on papules noted on abdomen, some with surrounding rim of erythema.  - no other lesions of concern on areas examined.     Medications:  Current Outpatient Medications   Medication     benzoyl peroxide 5 % external liquid     chlorhexidine (HIBICLENS) 4 % liquid     clindamycin (CLEOCIN T) 1 % external lotion     hydrocortisone 2.5 % ointment     hydrocortisone, Perianal, (HYDROCORTISONE) 2.5 % cream     MedroxyPROGESTERone Acetate (DEPO-PROVERA  IM)     spironolactone (ALDACTONE) 100 MG tablet     Current Facility-Administered Medications   Medication     triamcinolone acetonide (KENALOG-10) injection 10 mg      Past Medical History:   Patient Active Problem List   Diagnosis     Glaucoma suspect     Glaucoma suspect, bilateral     Past Medical History:   Diagnosis Date     Glaucoma

## 2022-04-28 NOTE — PROGRESS NOTES
Drug Administration Record    Prior to injection, verified patient identity using patient's name and date of birth.  Due to injection administration, patient instructed to remain in clinic for 15 minutes  afterwards, and to report any adverse reaction to me immediately.    Drug Name: triamcinolone acetonide(kenalog)  Dose: 1mL of triamcinolone 10mg/mL, 10mg dose  Route administered: ID  NDC #: Kenalog-10 (5567-4028-55)  Amount of waste(mL):4  Reason for waste: Multi dose vial    LOT #: [id number]  SITE: see note  : [employer]  EXPIRATION DATE: 9/2023

## 2022-04-28 NOTE — NURSING NOTE
Dermatology Rooming Note    Queenie Rutledge's goals for this visit include:   Chief Complaint   Patient presents with     Derm Problem     HS injection, states her upper thigh has been causing her skin pain, states she has an upcoming apt with Dr. Nelson for skin tags and would like to talk about that today      Marie Murphy, Visit Facilitator

## 2022-05-11 ENCOUNTER — MYC MEDICAL ADVICE (OUTPATIENT)
Dept: DERMATOLOGY | Facility: CLINIC | Age: 37
End: 2022-05-11
Payer: COMMERCIAL

## 2022-05-15 ENCOUNTER — HEALTH MAINTENANCE LETTER (OUTPATIENT)
Age: 37
End: 2022-05-15

## 2022-05-19 ENCOUNTER — OFFICE VISIT (OUTPATIENT)
Dept: DERMATOLOGY | Facility: CLINIC | Age: 37
End: 2022-05-19
Payer: COMMERCIAL

## 2022-05-19 DIAGNOSIS — L73.2 HIDRADENITIS SUPPURATIVA: ICD-10-CM

## 2022-05-19 DIAGNOSIS — L91.8 INFLAMED SKIN TAG: ICD-10-CM

## 2022-05-19 DIAGNOSIS — L30.9 DERMATITIS: Primary | ICD-10-CM

## 2022-05-19 PROCEDURE — 11200 RMVL SKIN TAGS UP TO&INC 15: CPT | Mod: GC

## 2022-05-19 PROCEDURE — 11900 INJECT SKIN LESIONS </W 7: CPT | Mod: XS

## 2022-05-19 PROCEDURE — 99213 OFFICE O/P EST LOW 20 MIN: CPT | Mod: 25

## 2022-05-19 RX ORDER — TACROLIMUS 1 MG/G
OINTMENT TOPICAL 2 TIMES DAILY
Qty: 100 G | Refills: 11 | Status: SHIPPED | OUTPATIENT
Start: 2022-05-19 | End: 2024-02-29

## 2022-05-19 RX ORDER — HYDROCORTISONE 25 MG/G
OINTMENT TOPICAL 2 TIMES DAILY
Qty: 30 G | Refills: 0 | Status: SHIPPED | OUTPATIENT
Start: 2022-05-19

## 2022-05-19 ASSESSMENT — PAIN SCALES - GENERAL: PAINLEVEL: NO PAIN (0)

## 2022-05-19 NOTE — PATIENT INSTRUCTIONS
Use hydrocortisone twice daily for one week on the face/eyelids for dermatitis.  Then switch to protopic twice daily as needed.

## 2022-05-19 NOTE — PROGRESS NOTES
Drug Administration Record    Prior to injection, verified patient identity using patient's name and date of birth.  Due to injection administration, patient instructed to remain in clinic for 15 minutes  afterwards, and to report any adverse reaction to me immediately.    Drug Name: triamcinolone acetonide(kenalog)  Dose: 0.5mL of triamcinolone 10mg/mL, 5mg dose  Route administered: ID  NDC #: Kenalog-10 (2092-9958-45)  Amount of waste(mL):4.5  Reason for waste: Multi dose vial    LOT #: [id number]  SITE: see note  : [employer]  EXPIRATION DATE: 9/2023

## 2022-05-19 NOTE — LETTER
5/19/2022       RE: Queenie Rutledge  [address] [facility] [address]     Dear Colleague,    Thank you for referring your patient, Queenie Rutledge, to the [facility] DERMATOLOGY CLINIC [place] at [facility]. Please see a copy of my visit note below.    [facility] Dermatology Note  Encounter Date: May 19, 2022  Office Visit     Dermatology Problem List:  *ISABELA CC*  1. Mild hidradenitis suppurativa vs recurrent furunculosis - axilla, groin. Favor HS due to history of pilonidal cyst.  - 10/28/20 ILK to R groin, 8/25/21 L groin, 1/25/22 & 2/3/22 R groin, 3/31/22, 5/19/22 for axillary nodule   - Current tx: spironolactone 100 mg every day (started 12/2021), Hibiclens 4% wash daily, Clindamycin 1% BID when flares, birth control (depo inj), LHR with Dr. Valencia   - Previous tx: doxycycline 100mg po BID x10 days with flares, BPO 5% wash, hx of I&D   - Future considerations: Humira, isotretinoin, excision  2. Dermatitis - bilateral areolae - desonide 0.05% cream  3. Intertrigo -  hydrocortisone 2.5% cream, keto 2% cream  4. Hypopigmented macule, possible PIH, L thigh -  hydrocortisone 2.5% cream  5. Eyelid dermatitis 2/2 irritation, HC 2.5% ointment, protopic  6. Skin tags, LN      Social: Would like liposuction, but unable to schedule due to ongoing open lesions 2/2 HS   ____________________________________________    Assessment & Plan:     # Hidradenitis suppurativeJessica stage I. Flare today in the left axilla, injected today with IL-K. We also revisited discussion on biologics and patient is considering starting; to be discussed further at follow-up.    - IL-K injection today (see procedure note below)   - Continue daily baths with hibiclens   - Continue spironolactone 100mg daily   - Continue clindamycin lotion BID prn (refill sent)  - Continue LHR with Dr. Valencia - will be starting axillary hair removal in June   - Future  considerations: Humira, isotretinoin, excision     # Inflamed skin tags, axilla  - Cryotherapy x 5 today (see procedure note below)     # Eyelid dermatitis, 2/2 irritation due to itching caused by allergies, has eyedrops that help a lot   - HC 2.5% ointment x1 week then switch to protopic BID     Procedures Performed:   - Intra-lesional triamcinolone procedure note. After positioning and cleansing with isopropyl alcohol, 0.5 total mL of triamcinolone 10 mg/mL was injected into 1 lesion(s) on the right medial thigh. The patient tolerated the procedure well and left the dermatology clinic in good condition.    Cryotherapy procedure note: After verbal consent and discussion of risks and benefits including but no limited to dyspigmentation/scar, blister, and pain, 5 was(were) treated with 1-2mm freeze border for 2 cycles with liquid nitrogen. Post cryotherapy instructions were provided.     Follow-up: 3 months with me     Staff and Resident:     Irene Nelson MD     The patient was seen and staffed with Dr. Jonathon MD     I have personally examined this patient and agree with the resident doctor's documentation and plan of care. I have reviewed and amended the resident's note above. The documentation accurately reflects my clinical observations, diagnoses, treatment and follow-up plans. I was present for key portions of the procedure.       Ayesha Holt MD  Dermatology Staff    ____________________________________________    CC: HS (Queenie is here today for follow up for her HS. She also is having bad seasonal allergies which has dried out her face and made her skin very itchy.)    HPI:  Ms. Queenie Rutledge is a(n) 37 year old female who presents today as a return patient for follow-up of hidradenitis suppurativa. She has one papulonodule under the axilla that was very painful to her.  Was unable to get into clinic last week.  Would like this injected.  She also has some skin tags under the armpits and on the  neck and chest that are bothersome, test spot with LN by Dr. Valencia last month made a spot on the trunk smaller but did not fall off.  Per patient, Dr. Valencia is considering chemical peel for numerous tags/DPNs on the trunk.  She would like axilla removed because very irritating with friction and shaving.  She also has allergies right now that cause eye itching and irritation.  Her eyes had swelled up before she started eyedrops which are helpful.  Now just eyelid dermatitis.  Would like medicine for this.  She has been using a mariaelena mask.      Labs Reviewed:  N/A    Physical Exam:  Vitals: There were no vitals taken for this visit.  SKIN: Focused examination of abdomen and groin was performed.  - tender inflammatory nodule on left axilla   - there are tan to brown waxy stuck on papules noted on abdomen, some with surrounding rim of erythema.  - Left axilla with irritated pedunculated papules x5   - Xerosis and mild eczematous dermatitis on the eyelids and periorbital skin   - no other lesions of concern on areas examined.     Medications:  Current Outpatient Medications   Medication     benzoyl peroxide 5 % external liquid     chlorhexidine (HIBICLENS) 4 % liquid     clindamycin (CLEOCIN T) 1 % external lotion     hydrocortisone 2.5 % ointment     hydrocortisone, Perianal, (HYDROCORTISONE) 2.5 % cream     MedroxyPROGESTERone Acetate (DEPO-PROVERA IM)     spironolactone (ALDACTONE) 100 MG tablet     Current Facility-Administered Medications   Medication     triamcinolone acetonide (KENALOG-10) injection 10 mg     triamcinolone acetonide (KENALOG-10) injection 10 mg      Past Medical History:   Patient Active Problem List   Diagnosis     Glaucoma suspect     Glaucoma suspect, bilateral     Past Medical History:   Diagnosis Date     Glaucoma          Drug Administration Record    Prior to injection, verified patient identity using patient's name and date of birth.  Due to injection administration, patient instructed to  remain in clinic for 15 minutes  afterwards, and to report any adverse reaction to me immediately.    Drug Name: triamcinolone acetonide(kenalog)  Dose: 0.5mL of triamcinolone 10mg/mL, 5mg dose  Route administered: ID  NDC #: Kenalog-10 (8910-7556-25)  Amount of waste(mL):4.5  Reason for waste: Multi dose vial    LOT #: [id number]  SITE: see note  : [employer]  EXPIRATION DATE: 9/2023

## 2022-05-19 NOTE — PROGRESS NOTES
[facility] Dermatology Note  Encounter Date: May 19, 2022  Office Visit     Dermatology Problem List:  *ISABELA CC*  1. Mild hidradenitis suppurativa vs recurrent furunculosis - axilla, groin. Favor HS due to history of pilonidal cyst.  - 10/28/20 ILK to R groin, 8/25/21 L groin, 1/25/22 & 2/3/22 R groin, 3/31/22, 5/19/22 for axillary nodule   - Current tx: spironolactone 100 mg every day (started 12/2021), Hibiclens 4% wash daily, Clindamycin 1% BID when flares, birth control (depo inj), LHR with Dr. Valencia   - Previous tx: doxycycline 100mg po BID x10 days with flares, BPO 5% wash, hx of I&D   - Future considerations: Humira, isotretinoin, excision  2. Dermatitis - bilateral areolae - desonide 0.05% cream  3. Intertrigo -  hydrocortisone 2.5% cream, keto 2% cream  4. Hypopigmented macule, possible PIH, L thigh -  hydrocortisone 2.5% cream  5. Eyelid dermatitis 2/2 irritation, HC 2.5% ointment, protopic  6. Skin tags, LN      Social: Would like liposuction, but unable to schedule due to ongoing open lesions 2/2 HS   ____________________________________________    Assessment & Plan:     # Hidradenitis suppurative, Lopez stage I. Flare today in the left axilla, injected today with IL-K. We also revisited discussion on biologics and patient is considering starting; to be discussed further at follow-up.    - IL-K injection today (see procedure note below)   - Continue daily baths with hibiclens   - Continue spironolactone 100mg daily   - Continue clindamycin lotion BID prn (refill sent)  - Continue LHR with Dr. Valencia - will be starting axillary hair removal in June   - Future considerations: Humira, isotretinoin, excision     # Inflamed skin tags, axilla  - Cryotherapy x 5 today (see procedure note below)     # Eyelid dermatitis, 2/2 irritation due to itching caused by allergies, has eyedrops that help a lot   - HC 2.5% ointment x1 week then switch to protopic BID     Procedures Performed:   -  Intra-lesional triamcinolone procedure note. After positioning and cleansing with isopropyl alcohol, 0.5 total mL of triamcinolone 10 mg/mL was injected into 1 lesion(s) on the right medial thigh. The patient tolerated the procedure well and left the dermatology clinic in good condition.    Cryotherapy procedure note: After verbal consent and discussion of risks and benefits including but no limited to dyspigmentation/scar, blister, and pain, 5 was(were) treated with 1-2mm freeze border for 2 cycles with liquid nitrogen. Post cryotherapy instructions were provided.     Follow-up: 3 months with me     Staff and Resident:     Irene Nelson MD     The patient was seen and staffed with Dr. Jonathon MD     I have personally examined this patient and agree with the resident doctor's documentation and plan of care. I have reviewed and amended the resident's note above. The documentation accurately reflects my clinical observations, diagnoses, treatment and follow-up plans. I was present for key portions of the procedure.       Ayesha Holt MD  Dermatology Staff    ____________________________________________    CC: HS (Queenie is here today for follow up for her HS. She also is having bad seasonal allergies which has dried out her face and made her skin very itchy.)    HPI:  Ms. Queenie Rutledge is a(n) 37 year old female who presents today as a return patient for follow-up of hidradenitis suppurativa. She has one papulonodule under the axilla that was very painful to her.  Was unable to get into clinic last week.  Would like this injected.  She also has some skin tags under the armpits and on the neck and chest that are bothersome, test spot with LN by Dr. Valencia last month made a spot on the trunk smaller but did not fall off.  Per patient, Dr. Valencia is considering chemical peel for numerous tags/DPNs on the trunk.  She would like axilla removed because very irritating with friction and shaving.  She also has  allergies right now that cause eye itching and irritation.  Her eyes had swelled up before she started eyedrops which are helpful.  Now just eyelid dermatitis.  Would like medicine for this.  She has been using a mariaelena mask.      Labs Reviewed:  N/A    Physical Exam:  Vitals: There were no vitals taken for this visit.  SKIN: Focused examination of abdomen and groin was performed.  - tender inflammatory nodule on left axilla   - there are tan to brown waxy stuck on papules noted on abdomen, some with surrounding rim of erythema.  - Left axilla with irritated pedunculated papules x5   - Xerosis and mild eczematous dermatitis on the eyelids and periorbital skin   - no other lesions of concern on areas examined.     Medications:  Current Outpatient Medications   Medication     benzoyl peroxide 5 % external liquid     chlorhexidine (HIBICLENS) 4 % liquid     clindamycin (CLEOCIN T) 1 % external lotion     hydrocortisone 2.5 % ointment     hydrocortisone, Perianal, (HYDROCORTISONE) 2.5 % cream     MedroxyPROGESTERone Acetate (DEPO-PROVERA IM)     spironolactone (ALDACTONE) 100 MG tablet     Current Facility-Administered Medications   Medication     triamcinolone acetonide (KENALOG-10) injection 10 mg     triamcinolone acetonide (KENALOG-10) injection 10 mg      Past Medical History:   Patient Active Problem List   Diagnosis     Glaucoma suspect     Glaucoma suspect, bilateral     Past Medical History:   Diagnosis Date     Glaucoma

## 2022-05-19 NOTE — NURSING NOTE
Chief Complaint   Patient presents with     HS     Queenie is here today for follow up for her HS. She also is having bad seasonal allergies which has dried out her face and made her skin very itchy.     Shay Coffman, Paramedic

## 2022-05-20 NOTE — NURSING NOTE
Dermatology Rooming Note    Queenie Rutledge's goals for this visit include:   Chief Complaint   Patient presents with     Derm Problem     HS - Queenie has a flare up in her groin and her right armpit     Da Martínez, LOTUS     Postoperative Follow-up Assessment    Pain: "very painful in surgical area"  Ratin/10    Swelling: moderate, icing intermittently  NN encouraged continued use of intermittent ice, elevation, ambulation with RW as tolerated  Dressing: removed as painting incision with betadine per AVS  Pt and caregiver report incision "looks good, a little pink but looks good"  Denies redness, drainage, fevers  Ambulation: Walker    Falls: No    AVS: reviewed with patient      AVS Med List: Reviewed     Pain Medication Regimen: taking percocet, 1 tablet about every 4 hours for pain  She reports she is struggling with pain and is looking for additional recommendations from the surgeon  NN encouraged on continued use of percocet as needed for her severe pain, icing and elevating, ambulating with RW as tolerated  Anticoagulant: Arixtra, will be in today at pharmacy  NN educated importance of starting today, doing at same time each day and not missing doses  Pt verbalizes understanding  Follow-ups: PT today,     Concerning Symptoms to Patient: She is concerned about her post-surgical pain  NN is reaching out to surgeon for additional recommendations  Denies other concerning symptoms to her  Denies calf pain, redness, fevers, CP, SOB or dizziness  Physical Therapy: Pt did express hesitancy about attending PT today as scheduled r/t her pain  NN educated on importance of attempting to attend, trying to ambulate frequently, important PT sees her prior to weekend if possible  Pt is agreeable to attend her scheduled PT session today and will call back with any changes to her plan  Denies other questions or concerns at this time  Pt encouraged to call with any questions, concerns or issues

## 2022-06-03 ENCOUNTER — OFFICE VISIT (OUTPATIENT)
Dept: DERMATOLOGY | Facility: CLINIC | Age: 37
End: 2022-06-03
Payer: COMMERCIAL

## 2022-06-03 DIAGNOSIS — L73.2 HIDRADENITIS SUPPURATIVA: Primary | ICD-10-CM

## 2022-06-03 PROCEDURE — 17999 UNLISTD PX SKN MUC MEMB SUBQ: CPT | Mod: 58 | Performed by: DERMATOLOGY

## 2022-06-03 NOTE — PROGRESS NOTES
Laser Hair Removal Gentle Max Prox (1064nm)  Procedure Date: Isaac 3, 2022    Staff Surgeon: Dr. Christa Valencia    Resident Surgeon: none    Assistant: Denisha Cho RN and  Krystina Lee CMA       Trimming required prior to treatment in clinic?: Yes, patient shaving prior.   Forman skin type: V  Diagnosis/Treatment Reason: Hidradenitis supparativa    Treatment#: 3 for inguinal creases, 1 for bilateral axilla and mons pubis and labia per patient request. Pt having improvement and requests expansion to the other areas. Verbal consent to add mons pubis during procedure.     Laser Settings:  Wavelength(755/1064nm):1064   Location: bilateral axilla  Fluence: 25  Spot size: 10  Pulse width:20 mS ( mS)   Total Number of Pulses: 99  Dynamic cooling spray settin mS  Dynamic cooling device delay:  30 mS    Laser Settings:  Wavelength 1064  Location: inguinal creases  Fluence: 25 mj  Spot size: 10  Pulse width:  20 mS ( mS)   Total Number of Pulses: 105  Dynamic cooling spray settin mS  Dynamic cooling device delay:  30 mS      Anesthesia:  See nursing record, topical    Walt used?:No       Description of Operation/Procedure:    The nature and purpose of the procedure, associated risks, possible consequences, complications and alternative methods of treatment were explained in detail, this includes but is not limited to hyperpigmentation, hypopigmentation, scarring, bruising, hair loss pain/discomfort, eye injury, paradoxical hair growth, hives, infection, itching  and blister.    Multiple treatments are required. Typically 3-6 treatments are needed to see disease improvement but improvement is not guaranteed.  This treatment may not work. The use of numbing topicals can help with pain, but does not eliminate pain. Reviewed that the patient can terminate the procedure at anytime.         A photo and operative consent were obtained. Time-out was performed.  The patient was positioned to optimally  expose the area treated. Dynamic cooling was verified and functioning properly.  Protective eyewear was worn by the patient and goggles on all personnel in the treatment room.  The patient confirmed the site to be treated. The laser energy output was verified by meter reading.  N95 and buffalo filtration was used.     The clinically evident lesion(s) was/were treated with laser beam as above with 1 pass.  The patient tolerated the procedure well and no complications were noted. Post operative instructions were provided. The total laser operation and preparation time was approximately 60minutes.  The patient was counseled to contact us immediately for any concerns. The patient will use the phone, not my chart, for urgent concerns as we reviewed that mychart messaging is delayed. The patient will avoid the sun in these areas post procedure. The patient was offered and recommended to read their after visit summary.     We reviewed the need to use the phone for any emergencies due the the mychart delay    The patient will follow-up in 4-12 weeks    Staff Involved:  Scribe/Staff    Scribe Disclosure:   I, Robert Mckinnon, am serving as a scribe to document services personally performed by this physician, Dr. Christa Valencia, based on data collection and the provider's statements to me.     Provider Disclosure:   The documentation recorded by the scribe accurately reflects the services I personally performed and the decisions made by me.    Christa Valencia MD    Department of Dermatology  [facility] [place]: Phone: [phone], Fax:[phone]  [facility]: Phone: [phone], Fax: [phone]

## 2022-06-03 NOTE — PATIENT INSTRUCTIONS
Gentle Max Hidradenitis Instructions    Laser : I will have redness, pain and swelling. I may have skin discoloration, bruising and itching. Risks are permenant discoloration, hives, infection scarring, eye injury,hair growth, and blister. The outcome could be no improvement or slight improvement. Multiple treatments are required. All hair will not be removed.     This treatment is typically done as a series of approximately 6 treatments.     The numbing cream will not completely numb the area.     You may stop the procedure at anytime.     The laser is not FDA approved for hidradenitis.    Before the procedure:  Sun Protection:  Do not allow the area to become tan or come in with a tan for a treatment. Tans will increased the risks of the procedure. Do not use spray or any over counter product self tanners prior to the procedure.     Shaving:   Gently shave the area the evening before the procedure the night prior.      Other:  Avoid any retinols such as Differin, adapalene, retinol or tretinoin to the treated area 1 week prior. Hold bleaching creams such as hydroquinone until 1 week prior.  Avoid any hair removal procedures such as waxing, electrolysis or other lasers on the skin within 6 weeks prior. Do not have any other cosmetic procedures done on the area within the prior 6 weeks such as chemical peels or dermabrasion.      Post Procedure:  Day 1-7  The healing time for any given treatment varies between clients. The following represents the general recovery phases you might expect. Individual clients may experience variations from this course.     Swelling/Discomfort/Redness:  The most common side effects are erythema and edema (redness and swelling) which generally occur immediately after and typically resolve within 48 hours. If crusting and blistering occurs call the clinic.     Activity:  Avoid swimming the day of laser hair removal treatment. Also, no rigorous exercise the day of treatment.      Moisturizers and Sunscreens:  Wait until the skin has returned to normal to start topical products.     Sun Protection:  Do not allow the area to become tan or come in with a tan for a treatment. Tans will increased the risks of the procedure. Do not use spray or any over counter product self tanners prior to the procedure.     Warning signs:  Call the clinic if you have significant pain, increasing redness, pus, blisters/crusting or for any other concerns.     Who should I call with questions?  [facility]: [phone]  [facility]: [phone]  For urgent needs outside of business hours call the [facility] at [phone] and ask for the dermatology resident on call  [employer] messaging response may be delayed by several days

## 2022-06-03 NOTE — LETTER
6/3/2022         RE: Queenie Rutledge  [address] [address]        Dear Colleague,    Thank you for referring your patient, Queenie Rutledge, to the [facility]. Please see a copy of my visit note below.          Laser Hair Removal Gentle Max Prox (1064nm)  Procedure Date: Isaac 3, 2022    Staff Surgeon: Dr. Christa Valencia    Resident Surgeon: none    Assistant: Denisha Cho RN and  Krystina Lee CMA       Trimming required prior to treatment in clinic?: Yes, patient shaving prior.   Forman skin type: V  Diagnosis/Treatment Reason: Hidradenitis supparativa    Treatment#: 3 for inguinal creases, 1 for bilateral axilla and mons pubis and labia per patient request. Pt having improvement and requests expansion to the other areas. Verbal consent to add mons pubis during procedure.     Laser Settings:  Wavelength(755/1064nm):1064   Location: bilateral axilla  Fluence: 25  Spot size: 10  Pulse width:20 mS ( mS)   Total Number of Pulses: 99  Dynamic cooling spray settin mS  Dynamic cooling device delay:  30 mS    Laser Settings:  Wavelength 1064  Location: inguinal creases  Fluence: 25 mj  Spot size: 10  Pulse width:  20 mS ( mS)   Total Number of Pulses: 105  Dynamic cooling spray settin mS  Dynamic cooling device delay:  30 mS      Anesthesia:  See nursing record, topical    Walt used?:No       Description of Operation/Procedure:    The nature and purpose of the procedure, associated risks, possible consequences, complications and alternative methods of treatment were explained in detail, this includes but is not limited to hyperpigmentation, hypopigmentation, scarring, bruising, hair loss pain/discomfort, eye injury, paradoxical hair growth, hives, infection, itching  and blister.    Multiple treatments are required. Typically 3-6 treatments are needed to see disease improvement but improvement is not guaranteed.  This treatment may not work. The use  of numbing topicals can help with pain, but does not eliminate pain. Reviewed that the patient can terminate the procedure at anytime.         A photo and operative consent were obtained. Time-out was performed.  The patient was positioned to optimally expose the area treated. Dynamic cooling was verified and functioning properly.  Protective eyewear was worn by the patient and goggles on all personnel in the treatment room.  The patient confirmed the site to be treated. The laser energy output was verified by meter reading.  N95 and buffalo filtration was used.     The clinically evident lesion(s) was/were treated with laser beam as above with 1 pass.  The patient tolerated the procedure well and no complications were noted. Post operative instructions were provided. The total laser operation and preparation time was approximately 60minutes.  The patient was counseled to contact us immediately for any concerns. The patient will use the phone, not my chart, for urgent concerns as we reviewed that mychart messaging is delayed. The patient will avoid the sun in these areas post procedure. The patient was offered and recommended to read their after visit summary.     We reviewed the need to use the phone for any emergencies due the the mychart delay    The patient will follow-up in 4-12 weeks    Staff Involved:  Scribe/Staff    Scribe Disclosure:   I, Robert Mckinnon, am serving as a scribe to document services personally performed by this physician, Dr. Christa Valencia, based on data collection and the provider's statements to me.     Provider Disclosure:   The documentation recorded by the scribe accurately reflects the services I personally performed and the decisions made by me.    Christa Valencia MD    Department of Dermatology  [facility]: Phone: [phone], Fax:[phone]  [facility]:  Phone: [phone], Fax: [phone]          Again, thank you for allowing me to participate in the care of your patient.        Sincerely,        Christa Valencia MD

## 2022-06-03 NOTE — NURSING NOTE
Dermatology Laser Intake Checklist:  History of psoriasis: No  History of recent tan, indoor or outdoor tanning/vacation or other sun exposure: No  History of vitiligo: pt notes possible? She had a white spot in the past that resolved within the last 6 months in groin area.    Family history of vitiligo: No  Recent other cosmetic procedure(microderm abrasion/peel/hair removal/facial etc): No  History of HSV: No  Did the patient start valtrex: N/A  For genital laser hair removal patient only: Is there a history of genital warts or condyloma: No  Tattoo in the area to be treated: No  Is patient using hydroquinone: No  Retinoids and other acne medications stopped for 2 weeks: No  Has the patient had accutane in the last 6-12 months: No  Pregnant or breastfeeding: N/A  History of skin cancer in area planned for treatment: No  History of treatment with gold: No  Changes in medical history: No  Photos obtained: No  Does the patient smoke: No  Is the patient on ibuprofen/aspirin/plavix/coumadin/other blood thinner: No  If patient is taking narcotic or diazepam(valium)-does patient have : No    Patient shaved both areas this morning.  Denisha Cho RN

## 2022-07-08 ENCOUNTER — OFFICE VISIT (OUTPATIENT)
Dept: DERMATOLOGY | Facility: CLINIC | Age: 37
End: 2022-07-08
Payer: COMMERCIAL

## 2022-07-08 DIAGNOSIS — L73.2 HIDRADENITIS SUPPURATIVA: Primary | ICD-10-CM

## 2022-07-08 PROCEDURE — 17999 UNLISTD PX SKN MUC MEMB SUBQ: CPT | Mod: 58 | Performed by: DERMATOLOGY

## 2022-07-08 NOTE — NURSING NOTE
The following medication was applied to skin:    MEDICATION: 4% licodaine  ROUTE: Topical   SITE: mons pubis, bilateral groin creases, vulva and inner thighs  DOSE: 20g  LOT #: [id number]  : [facility]  EXPIRATION DATE: 2/24  Was there drug waste? No  Multi-dose tube: No      MEDICATION: Benzocaine 20%/Lidocaine 6%/Tetracaine 4%  ROUTE: Topical   SITE: bilateral axillae  DOSE: 4 grams  LOT #: [id number]  : [place]  EXPIRATION DATE: 9/5/22  Was there drug waste? No  Multi-dose vial: Yes    Tamara Stephens RN

## 2022-07-08 NOTE — PROGRESS NOTES
Laser Hair Removal Gentle Max Prox (1064nm)  Procedure Date: 2022    Staff Surgeon: Dr. Christa Valencia    Resident Surgeon: none    Assistant: Tamara Stephens RN and Krystina Lee LPN    Trimming required prior to treatment in clinic?: Yes, patient shaving prior.   Forman skin type: V  Diagnosis/Treatment Reason: Hidradenitis supparativa    Treatment#: 4 for inguinal creases, 2 for bilateral axilla and mons pubis and labia per patient request. Pt having improvement, possibly flaring in the right groin.    Laser Settings:  Wavelength(755/1064nm):1064   Location: bilateral axilla  Fluence: 26  Spot size: 10  Pulse width:20 mS ( mS)   Total Number of Pulses: 99  Dynamic cooling spray settin mS  Dynamic cooling device delay:  30 mS    Laser Settings:  Wavelength 1064    Location: inguinal creases  Fluence: 25 mj  Spot size: 10  Pulse width:  20 mS ( mS)   Total Number of Pulses: 105  Dynamic cooling spray settin mS  Dynamic cooling device delay:  30 mS      Anesthesia:  See nursing record, topical    Walt used?:No     Description of Operation/Procedure:    The nature and purpose of the procedure, associated risks, possible consequences, complications and alternative methods of treatment were explained in detail, this includes but is not limited to hyperpigmentation, hypopigmentation, scarring, bruising, hair loss pain/discomfort, eye injury, paradoxical hair growth, hives, infection, itching  and blister.    Multiple treatments are required. Typically 3-6 treatments are needed to see disease improvement but improvement is not guaranteed.  This treatment may not work. The use of numbing topicals can help with pain, but does not eliminate pain. Reviewed that the patient can terminate the procedure at anytime.       A photo and operative consent were obtained. Time-out was performed.  The patient was positioned to optimally expose the area treated. Dynamic cooling was verified and functioning  properly.  Protective eyewear was worn by the patient and goggles on all personnel in the treatment room.  The patient confirmed the site to be treated. The laser energy output was verified by meter reading.  N95 and buffalo filtration was used.     The clinically evident lesion(s) was/were treated with laser beam as above with 1 pass.  The patient tolerated the procedure well and no complications were noted. Post operative instructions were provided. The total laser operation and preparation time was approximately 60minutes.  The patient was counseled to contact us immediately for any concerns. The patient will use the phone, not my chart, for urgent concerns as we reviewed that mychart messaging is delayed. The patient will avoid the sun in these areas post procedure. The patient was offered and recommended to read their after visit summary.     We reviewed the need to use the phone for any emergencies due the the mychart delay    The patient will follow-up in 4-12 weeks    Staff Involved:  Scribe/Staff    Scribe Disclosure:   I, Robert Mckinnon, am serving as a scribe to document services personally performed by this physician, Dr. Christa Valencia, based on data collection and the provider's statements to me.     Provider Disclosure:   The documentation recorded by the scribe accurately reflects the services I personally performed and the decisions made by me.    Christa Valencia MD    Department of Dermatology  [facility] [place]: Phone: [phone], Fax:[phone]  [facility]: Phone: [phone], Fax: [phone]

## 2022-07-08 NOTE — PATIENT INSTRUCTIONS
Gentle Max Hidradenitis Instructions    Laser : I will have redness, pain and swelling. I may have skin discoloration, bruising and itching. Risks are permenant discoloration, hives, infection scarring, eye injury,hair growth, and blister. The outcome could be no improvement or slight improvement. Multiple treatments are required. All hair will not be removed.     This treatment is typically done as a series of approximately 6 treatments.     The numbing cream will not completely numb the area.     You may stop the procedure at anytime.     The laser is not FDA approved for hidradenitis.        Before the procedure:  Sun Protection:  Do not allow the area to become tan or come in with a tan for a treatment. Tans will increased the risks of the procedure. Do not use spray or any over counter product self tanners prior to the procedure.     Shaving:   Gently shave the area the evening before the procedure the night prior.      Other:  Avoid any retinols such as Differin, adapalene, retinol or tretinoin to the treated area 1 week prior. Hold bleaching creams such as hydroquinone until 1 week prior.  Avoid any hair removal procedures such as waxing, electrolysis or other lasers on the skin within 6 weeks prior. Do not have any other cosmetic procedures done on the area within the prior 6 weeks such as chemical peels or dermabrasion.      Post Procedure:  Day 1-7  The healing time for any given treatment varies between clients. The following represents the general recovery phases you might expect. Individual clients may experience variations from this course.     Swelling/Discomfort/Redness:  The most common side effects are erythema and edema (redness and swelling) which generally occur immediately after and typically resolve within 48 hours. If crusting and blistering occurs call the clinic.     Activity:  Avoid swimming the day of laser hair removal treatment. Also, no rigorous exercise the day of treatment.      Moisturizers and Sunscreens:  Wait until the skin has returned to normal to start topical products.     Sun Protection:  Do not allow the area to become tan or come in with a tan for a treatment. Tans will increased the risks of the procedure. Do not use spray or any over counter product self tanners prior to the procedure.     Warning signs:  Call the clinic if you have significant pain, increasing redness, pus, blisters/crusting or for any other concerns.     Who should I call with questions?  [facility]: [phone]  [facility]: [phone]  For urgent needs outside of business hours call the [facility] at [phone] and ask for the dermatology resident on call  [employer] messaging response may be delayed by several days

## 2022-07-08 NOTE — LETTER
2022         RE: Queenie Rutledge  [address] [facility] [address]        Dear Colleague,    Thank you for referring your patient, Queenie Rutledge, to the [facility]. Please see a copy of my visit note below.    Laser Hair Removal Gentle Max Prox (1064nm)  Procedure Date: 2022    Staff Surgeon: Dr. Christa Valencia    Resident Surgeon: none    Assistant: Tamara Stephens, RN and Krystina Lee LPN    Trimming required prior to treatment in clinic?: Yes, patient shaving prior.   Forman skin type: V  Diagnosis/Treatment Reason: Hidradenitis supparativa    Treatment#: 4 for inguinal creases, 2 for bilateral axilla and mons pubis and labia per patient request. Pt having improvement, possibly flaring in the right groin.    Laser Settings:  Wavelength(755/1064nm):1064   Location: bilateral axilla  Fluence: 26  Spot size: 10  Pulse width:20 mS ( mS)   Total Number of Pulses: 99  Dynamic cooling spray settin mS  Dynamic cooling device delay:  30 mS    Laser Settings:  Wavelength 1064    Location: inguinal creases  Fluence: 25 mj  Spot size: 10  Pulse width:  20 mS ( mS)   Total Number of Pulses: 105  Dynamic cooling spray settin mS  Dynamic cooling device delay:  30 mS      Anesthesia:  See nursing record, topical    Walt used?:No     Description of Operation/Procedure:    The nature and purpose of the procedure, associated risks, possible consequences, complications and alternative methods of treatment were explained in detail, this includes but is not limited to hyperpigmentation, hypopigmentation, scarring, bruising, hair loss pain/discomfort, eye injury, paradoxical hair growth, hives, infection, itching  and blister.    Multiple treatments are required. Typically 3-6 treatments are needed to see disease improvement but improvement is not guaranteed.  This treatment may not work. The use of numbing topicals can help with pain, but does not eliminate pain.  Reviewed that the patient can terminate the procedure at anytime.       A photo and operative consent were obtained. Time-out was performed.  The patient was positioned to optimally expose the area treated. Dynamic cooling was verified and functioning properly.  Protective eyewear was worn by the patient and goggles on all personnel in the treatment room.  The patient confirmed the site to be treated. The laser energy output was verified by meter reading.  N95 and buffalo filtration was used.     The clinically evident lesion(s) was/were treated with laser beam as above with 1 pass.  The patient tolerated the procedure well and no complications were noted. Post operative instructions were provided. The total laser operation and preparation time was approximately 60minutes.  The patient was counseled to contact us immediately for any concerns. The patient will use the phone, not my chart, for urgent concerns as we reviewed that mychart messaging is delayed. The patient will avoid the sun in these areas post procedure. The patient was offered and recommended to read their after visit summary.     We reviewed the need to use the phone for any emergencies due the the mychart delay    The patient will follow-up in 4-12 weeks    Staff Involved:  Scribe/Staff    Scribe Disclosure:   I, Robert Mckinnon, am serving as a scribe to document services personally performed by this physician, Dr. Christa Valencia, based on data collection and the provider's statements to me.     Provider Disclosure:   The documentation recorded by the scribe accurately reflects the services I personally performed and the decisions made by me.    Christa Valencia MD    Department of Dermatology  [facility]: Phone: [phone], Fax:[phone]  [facility]: Phone: [phone], Fax: [phone]          Again, thank you for  allowing me to participate in the care of your patient.        Sincerely,        Christa Valencia MD

## 2022-07-08 NOTE — NURSING NOTE
Dermatology Laser Intake Checklist:  History of psoriasis: No  History of recent tan, indoor or outdoor tanning/vacation or other sun exposure: No  History of vitiligo: No  Family history of vitiligo: No  Recent other cosmetic procedure(microderm abrasion/peel/hair removal/facial etc): No  History of HSV: No  Did the patient start valtrex: N/A  For genital laser hair removal patient only: Is there a history of genital warts or condyloma: No  Tattoo in the area to be treated: No  Is patient using hydroquinone: No  Retinoids and other acne medications stopped for 2 weeks: No  Has the patient had accutane in the last 6-12 months: No  Pregnant or breastfeeding: N/A  History of skin cancer in area planned for treatment: No  History of treatment with gold: No  Changes in medical history: No  Photos obtained: No  Does the patient smoke: No  Is the patient on ibuprofen/aspirin/plavix/coumadin/other blood thinner: No  If patient is taking narcotic or diazepam(valium)-does patient have : No    Tamara Stephens RN

## 2022-08-05 ENCOUNTER — OFFICE VISIT (OUTPATIENT)
Dept: DERMATOLOGY | Facility: CLINIC | Age: 37
End: 2022-08-05
Payer: COMMERCIAL

## 2022-08-05 DIAGNOSIS — L73.2 HIDRADENITIS SUPPURATIVA: Primary | ICD-10-CM

## 2022-08-05 PROCEDURE — 17999 UNLISTD PX SKN MUC MEMB SUBQ: CPT | Mod: 58 | Performed by: DERMATOLOGY

## 2022-08-05 RX ORDER — CHOLECALCIFEROL (VITAMIN D3) 25 MCG
1 CAPSULE ORAL DAILY
COMMUNITY

## 2022-08-05 ASSESSMENT — PAIN SCALES - GENERAL: PAINLEVEL: NO PAIN (0)

## 2022-08-05 NOTE — PATIENT INSTRUCTIONS
Gentle Max Hidradenitis Instructions    Laser : I will have redness, pain and swelling. I may have skin discoloration, bruising and itching. Risks are permenant discoloration, hives, infection scarring, eye injury,hair growth, and blister. The outcome could be no improvement or slight improvement. Multiple treatments are required. All hair will not be removed.     This treatment is typically done as a series of approximately 6 treatments.     The numbing cream will not completely numb the area.     You may stop the procedure at anytime.     The laser is not FDA approved for hidradenitis.        Before the procedure:  Sun Protection:  Do not allow the area to become tan or come in with a tan for a treatment. Tans will increased the risks of the procedure. Do not use spray or any over counter product self tanners prior to the procedure.     Shaving:   Gently shave the area the evening before the procedure the night prior.      Other:  Avoid any retinols such as Differin, adapalene, retinol or tretinoin to the treated area 1 week prior. Hold bleaching creams such as hydroquinone until 1 week prior.  Avoid any hair removal procedures such as waxing, electrolysis or other lasers on the skin within 6 weeks prior. Do not have any other cosmetic procedures done on the area within the prior 6 weeks such as chemical peels or dermabrasion.      Post Procedure:  Day 1-7  The healing time for any given treatment varies between clients. The following represents the general recovery phases you might expect. Individual clients may experience variations from this course.     Swelling/Discomfort/Redness:  The most common side effects are erythema and edema (redness and swelling) which generally occur immediately after and typically resolve within 48 hours. If crusting and blistering occurs call the clinic.     Activity:  Avoid swimming the day of laser hair removal treatment. Also, no rigorous exercise the day of treatment.      Moisturizers and Sunscreens:  Wait until the skin has returned to normal to start topical products.     Sun Protection:  Do not allow the area to become tan or come in with a tan for a treatment. Tans will increased the risks of the procedure. Do not use spray or any over counter product self tanners prior to the procedure.     Warning signs:  Call the clinic if you have significant pain, increasing redness, pus, blisters/crusting or for any other concerns.     Who should I call with questions?  [facility]: [phone]  [facility]: [phone]  For urgent needs outside of business hours call the [facility] at [phone] and ask for the dermatology resident on call  [employer] messaging response may be delayed by several days

## 2022-08-05 NOTE — PROCEDURES
Dermatology Laser Intake Checklist:  History of psoriasis: No  History of recent tan, indoor or outdoor tanning/vacation or other sun exposure: No  History of vitiligo: No  Family history of vitiligo: No  Recent other cosmetic procedure(microderm abrasion/peel/hair removal/facial etc): No  History of HSV: No  Did the patient start valtrex: N/A  For genital laser hair removal patient only: Is there a history of genital warts or condyloma: No  Tattoo in the area to be treated: No  Is patient using hydroquinone: No  Retinoids and other acne medications stopped for 2 weeks: No  Has the patient had accutane in the last 6-12 months: No  Pregnant or breastfeeding: N/A  History of skin cancer in area planned for treatment: No  History of treatment with gold: No  Changes in medical history: No  Photos obtained: No  Does the patient smoke: No  Is the patient on ibuprofen/aspirin/plavix/coumadin/other blood thinner: No  If patient is taking narcotic or diazepam(valium)-does patient have : No    BLT     The following medication was given:     MEDICATION: Benzocaine 20%200mg/g / Lidocaine 6% 60mg/g / Tetracaine 4% (40mg/g)   ROUTE: Topical   SITE: Bilateral Axillae  DOSE: 5gms  LOT #: [id number]  :Reuben  EXPIRATION DATE: 10/30/2022  NDC#: [address]-72  Was there drug waste? No  Multi-dose vial: Yes    LMX    The following medication was given:     MEDICATION:  Lidocaine 4% Topical Cream  ROUTE: Topical  SITE: Bilateral inguinal creases/Mons/Labia per pt request  DOSE: 10gms  LOT #: [id number]  : Focus   EXPIRATION DATE: 09/2024  NDC#:91672-490-62  Was there drug waste? No  Multi-dose vial: Yes    Ciera Ibrahim RN  August 5, 2022

## 2022-08-05 NOTE — PROGRESS NOTES
Laser Hair Removal Gentle Max Prox (1064nm)  Procedure Date: Aug 5, 2022    Staff Surgeon: Dr. Christa Valencia    Resident Surgeon: none    Assistant:  Krystina Lee CMA    Trimming required prior to treatment in clinic?: Yes, patient shaving prior.   Forman skin type: V  Diagnosis/Treatment Reason: Hidradenitis supparativa    Treatment#: 5 for inguinal creases, 2 for bilateral axilla and mons pubis and labia per patient request. Denies blistering or scars. She has had no flares. She is happy with treatment and wants to proceed.     Laser Settings:  Wavelength(755/1064nm):1064  Location: bilateral axilla  Fluence: 26 mj  Spot size: 10  Pulse width:20 mS ( mS)   Total Number of Pulses: 99  Dynamic cooling spray settin mS  Dynamic cooling device delay:  30 mS    Laser Settings:  Wavelength 1064   Location: inguinal creases  Fluence: 25 mj  Spot size: 10  Pulse width:  20 mS ( mS)   Total Number of Pulses: 105  Dynamic cooling spray settin mS  Dynamic cooling device delay:  30 mS      Anesthesia:  See nursing record, topical    Walt used?:No     Description of Operation/Procedure:    The nature and purpose of the procedure, associated risks, possible consequences, complications and alternative methods of treatment were explained in detail, this includes but is not limited to hyperpigmentation, hypopigmentation, scarring, bruising, hair loss pain/discomfort, eye injury, paradoxical hair growth, hives, infection, itching  and blister.  Multiple treatments are required. Typically 3-6 treatments are needed to see disease improvement but improvement is not guaranteed.   The use of numbing topicals can help with pain, but does not eliminate pain. Reviewed that the patient can terminate the procedure at anytime.       A photo and operative consent were obtained. Time-out was performed.  The patient was positioned to optimally expose the area treated. Dynamic cooling was verified and functioning properly.   Protective eyewear was worn by the patient and goggles on all personnel in the treatment room.  The patient confirmed the site to be treated. The laser energy output was verified by meter reading.  N95 and buffalo filtration was used.     The clinically evident lesion(s) was/were treated with laser beam as above with 1 pass.  The patient tolerated the procedure well and no complications were noted. Post operative instructions were provided. The total laser operation and preparation time was approximately 60minutes.  The patient was counseled to contact us immediately for any concerns. The patient will use the phone, not my chart, for urgent concerns as we reviewed that mychart messaging is delayed. The patient will avoid the sun in these areas post procedure. The patient was offered and recommended to read their after visit summary.     We reviewed the need to use the phone for any emergencies due the the mychart delay    The patient will follow-up in 4-12 weeks    Staff Involved:  Scribe/Staff    Scribe Disclosure:   I, Robert Mckinnon, am serving as a scribe to document services personally performed by this physician, Dr. Christa Valencia, based on data collection and the provider's statements to me.     Provider Disclosure:   The documentation recorded by the scribe accurately reflects the services I personally performed and the decisions made by me. OK for nursing to numb with LMX prior to next visit. Reviewed risks of numbing including irritation and toxicity.     Christa Valencia MD    Department of Dermatology  [facility]: Phone: [phone], Fax:[phone]  [facility]: Phone: [phone], Fax: [phone]

## 2022-08-05 NOTE — NURSING NOTE
Queenie Rutledge's goals for this visit include:   Chief Complaint   Patient presents with     Procedure     Pt reports HS is doing okay reports one nodule in her right inner thigh and she still feels it but it is not flared        She requests these members of her care team be copied on today's visit information: no    PCP: Bharat Lopez    Referring Provider:  No referring provider defined for this encounter.    There were no vitals taken for this visit.    Do you need any medication refills at today's visit? No..Ciera Ibrahim RN

## 2022-08-05 NOTE — LETTER
2022         RE: Queenie Rutledge  [address] [facility] [address]        Dear Colleague,    Thank you for referring your patient, Queenie Rutledge, to the [facility]. Please see a copy of my visit note below.    Laser Hair Removal Gentle Max Prox (1064nm)  Procedure Date: Aug 5, 2022    Staff Surgeon: Dr. Christa Valencia    Resident Surgeon: none    Assistant:  Krystina Lee CMA    Trimming required prior to treatment in clinic?: Yes, patient shaving prior.   Forman skin type: V  Diagnosis/Treatment Reason: Hidradenitis supparativa    Treatment#: 5 for inguinal creases, 2 for bilateral axilla and mons pubis and labia per patient request. Denies blistering or scars. She has had no flares. She is happy with treatment and wants to proceed.     Laser Settings:  Wavelength(755/1064nm):1064  Location: bilateral axilla  Fluence: 26 mj  Spot size: 10  Pulse width:20 mS ( mS)   Total Number of Pulses: 99  Dynamic cooling spray settin mS  Dynamic cooling device delay:  30 mS    Laser Settings:  Wavelength 1064   Location: inguinal creases  Fluence: 25 mj  Spot size: 10  Pulse width:  20 mS ( mS)   Total Number of Pulses: 105  Dynamic cooling spray settin mS  Dynamic cooling device delay:  30 mS      Anesthesia:  See nursing record, topical    Walt used?:No     Description of Operation/Procedure:    The nature and purpose of the procedure, associated risks, possible consequences, complications and alternative methods of treatment were explained in detail, this includes but is not limited to hyperpigmentation, hypopigmentation, scarring, bruising, hair loss pain/discomfort, eye injury, paradoxical hair growth, hives, infection, itching  and blister.  Multiple treatments are required. Typically 3-6 treatments are needed to see disease improvement but improvement is not guaranteed.   The use of numbing topicals can help with pain, but does not eliminate pain. Reviewed  that the patient can terminate the procedure at anytime.       A photo and operative consent were obtained. Time-out was performed.  The patient was positioned to optimally expose the area treated. Dynamic cooling was verified and functioning properly.  Protective eyewear was worn by the patient and goggles on all personnel in the treatment room.  The patient confirmed the site to be treated. The laser energy output was verified by meter reading.  N95 and buffalo filtration was used.     The clinically evident lesion(s) was/were treated with laser beam as above with 1 pass.  The patient tolerated the procedure well and no complications were noted. Post operative instructions were provided. The total laser operation and preparation time was approximately 60minutes.  The patient was counseled to contact us immediately for any concerns. The patient will use the phone, not my chart, for urgent concerns as we reviewed that mychart messaging is delayed. The patient will avoid the sun in these areas post procedure. The patient was offered and recommended to read their after visit summary.     We reviewed the need to use the phone for any emergencies due the the mychart delay    The patient will follow-up in 4-12 weeks    Staff Involved:  Scribe/Staff    Scribe Disclosure:   I, Robert Mckinnon, am serving as a scribe to document services personally performed by this physician, Dr. Christa Valencia, based on data collection and the provider's statements to me.     Provider Disclosure:   The documentation recorded by the scribe accurately reflects the services I personally performed and the decisions made by me. OK for nursing to numb with LMX prior to next visit. Reviewed risks of numbing including irritation and toxicity.     Christa Valencia MD    Department of Dermatology  [facility]: Phone: [phone], Fax:[phone]  [facility]  [facility]: Phone: [phone], Fax: [phone]          Again, thank you for allowing me to participate in the care of your patient.        Sincerely,        Christa Valencia MD

## 2022-08-08 ENCOUNTER — OFFICE VISIT (OUTPATIENT)
Dept: URGENT CARE | Facility: URGENT CARE | Age: 37
End: 2022-08-08
Payer: COMMERCIAL

## 2022-08-08 VITALS
DIASTOLIC BLOOD PRESSURE: 75 MMHG | SYSTOLIC BLOOD PRESSURE: 107 MMHG | OXYGEN SATURATION: 98 % | TEMPERATURE: 98.4 F | HEART RATE: 91 BPM

## 2022-08-08 DIAGNOSIS — H60.331 ACUTE SWIMMER'S EAR OF RIGHT SIDE: Primary | ICD-10-CM

## 2022-08-08 PROCEDURE — 99203 OFFICE O/P NEW LOW 30 MIN: CPT | Performed by: PHYSICIAN ASSISTANT

## 2022-08-08 RX ORDER — NEOMYCIN SULFATE, POLYMYXIN B SULFATE AND HYDROCORTISONE 10; 3.5; 1 MG/ML; MG/ML; [USP'U]/ML
3 SUSPENSION/ DROPS AURICULAR (OTIC) 3 TIMES DAILY
Qty: 4 ML | Refills: 0 | Status: SHIPPED | OUTPATIENT
Start: 2022-08-08 | End: 2022-08-15

## 2022-08-08 ASSESSMENT — ENCOUNTER SYMPTOMS
FEVER: 0
SORE THROAT: 0
COUGH: 0

## 2022-08-08 NOTE — PROGRESS NOTES
SUBJECTIVE:   Queenie Rutledge is a 37 year old female presenting with a chief complaint of   Chief Complaint   Patient presents with     Urgent Care     Right ear pain x 7 days       She is an established patient of [facility].   Patient presents with right ear pain x 7 days.  No fevers.  Patient states she is a chronic ear  - with q tips.  She initially thought it was an irritation, but has not resolved and has become more painful.  She is unsure if there has been discharge since she cleans her ear so often.      Treatment:  Ibuprofen - helps.        Review of Systems   Constitutional: Negative for fever.   HENT: Positive for ear pain. Negative for sore throat.    Respiratory: Negative for cough.    All other systems reviewed and are negative.      Past Medical History:   Diagnosis Date     Glaucoma      Family History   Problem Relation Age of Onset     Diabetes No family hx of      Glaucoma No family hx of      Macular Degeneration No family hx of      Amblyopia No family hx of      Retinal detachment No family hx of      Thyroid Disease No family hx of      Melanoma No family hx of      Skin Cancer No family hx of      Current Outpatient Medications   Medication Sig Dispense Refill     benzoyl peroxide 5 % external liquid Use daily as a body wash as directed. 226 g 11     chlorhexidine (HIBICLENS) 4 % liquid Apply topically daily 473 mL 5     Cholecalciferol (VITAMIN D HIGH POTENCY) 25 MCG (1000 UT) CAPS        clindamycin (CLEOCIN T) 1 % external lotion Apply twice daily to any inflamed bumps on the skin as needed. 60 mL 3     Cyanocobalamin (VITAMIN B-12 PO)        hydrocortisone 2.5 % ointment Apply topically 2 times daily For 1 week at a time.  Then switch to protopic. 30 g 0     hydrocortisone 2.5 % ointment Apply topically 2 times daily 30 g 1     hydrocortisone, Perianal, (HYDROCORTISONE) 2.5 % cream Apply to inguinal groin area 1-2 times daily as needed 30 g 3     MedroxyPROGESTERone Acetate  (DEPO-PROVERA IM)        neomycin-polymyxin-hydrocortisone (CORTISPORIN) 3.5-01719-6 otic suspension Place 3 drops into the right ear 3 times daily for 7 days 4 mL 0     spironolactone (ALDACTONE) 100 MG tablet Take 1 tablet (100 mg) by mouth daily Take 50 mg daily (1/2 pill) at night for 2 weeks.  If no side effects, start taking 100 mg (full pill) at night. 90 tablet 3     tacrolimus (PROTOPIC) 0.1 % external ointment Apply topically 2 times daily To areas of dermatitis. 100 g 11     Social History     Tobacco Use     Smoking status: Never Smoker     Smokeless tobacco: Never Used   Substance Use Topics     Alcohol use: Not on file       OBJECTIVE  /75 (BP Location: Left arm, Patient Position: Sitting, Cuff Size: Adult Regular)   Pulse 91   Temp 98.4  F (36.9  C) (Oral)   SpO2 98%   Breastfeeding No     Physical Exam  Vitals and nursing note reviewed.   Constitutional:       Appearance: Normal appearance. She is normal weight.   HENT:      Head: Normocephalic.      Right Ear: Tympanic membrane and external ear normal.      Left Ear: Tympanic membrane, ear canal and external ear normal.      Ears:      Comments: Right ear pain with tragus movement.  Canal with mild yellow debris.    Cardiovascular:      Rate and Rhythm: Normal rate.   Neurological:      Mental Status: She is alert.   Psychiatric:         Mood and Affect: Mood normal.         Labs:  No results found for this or any previous visit (from the past 24 hour(s)).    X-Ray was not done.    ASSESSMENT:      ICD-10-CM    1. Acute swimmer's ear of right side  H60.331 neomycin-polymyxin-hydrocortisone (CORTISPORIN) 3.5-07945-3 otic suspension        Medical Decision Making:    Differential Diagnosis:  URI Adult/Peds:  Acute right otitis media and Otitis externa    Serious Comorbid Conditions:  Adult:  reviewed    PLAN:  Rx for cortisporin.  Patient education.  Discussed expected course.  No ear cleaning while on medication.      Followup:    If not  improving or if condition worsens, follow up with your Primary Care Provider, If not improving or if conditions worsens over the next 12-24 hours, go to the Emergency Department    There are no Patient Instructions on file for this visit.

## 2022-08-16 ENCOUNTER — OFFICE VISIT (OUTPATIENT)
Dept: FAMILY MEDICINE | Facility: CLINIC | Age: 37
End: 2022-08-16
Payer: COMMERCIAL

## 2022-08-16 VITALS
OXYGEN SATURATION: 97 % | DIASTOLIC BLOOD PRESSURE: 65 MMHG | RESPIRATION RATE: 16 BRPM | TEMPERATURE: 98.7 F | HEART RATE: 78 BPM | SYSTOLIC BLOOD PRESSURE: 94 MMHG | WEIGHT: 205.3 LBS

## 2022-08-16 DIAGNOSIS — H65.91 OME (OTITIS MEDIA WITH EFFUSION), RIGHT: Primary | ICD-10-CM

## 2022-08-16 PROCEDURE — 99213 OFFICE O/P EST LOW 20 MIN: CPT | Performed by: PHYSICIAN ASSISTANT

## 2022-08-16 RX ORDER — CEFDINIR 300 MG/1
300 CAPSULE ORAL 2 TIMES DAILY
Qty: 20 CAPSULE | Refills: 0 | Status: SHIPPED | OUTPATIENT
Start: 2022-08-16 | End: 2022-08-26

## 2022-08-16 RX ORDER — FLUCONAZOLE 150 MG/1
150 TABLET ORAL ONCE
Qty: 2 TABLET | Refills: 0 | Status: SHIPPED | OUTPATIENT
Start: 2022-08-16 | End: 2022-08-16

## 2022-08-16 ASSESSMENT — ENCOUNTER SYMPTOMS
COUGH: 0
SORE THROAT: 0
FEVER: 0

## 2022-08-16 NOTE — PROGRESS NOTES
Patient presents with:  Ear Problem: X 1 week. Watery sensation in Rt ear. Little drainage. On and off poking. No pain or other symptoms. Like to get Lt ear check. Follow up [facility] 8/8.      Clinical Decision Making: Ear exam is consistent with otitis media.  Patient started on Cefdinir.      ICD-10-CM    1. OME (otitis media with effusion), right  H65.91 cefdinir (OMNICEF) 300 MG capsule       Patient Instructions   1) Take antibiotic as prescribed. Take this medication with food to avoid stomach upset.   2) Ibuprofen or Tylenol as needed for fever or pain.  3) Follow up in 7-10 days if not improving, sooner if worsening or other concerns.         HPI:  Queenie Rutledge is a 37 year old female who presents today complaining of right ear watery discharge x 1 week. No pain. Patient previously seen for right swimmer's ear on 8/8 and she was treated with Cortisporin drops.    History obtained from the patient.    Problem List:  2016-06: Glaucoma suspect  2016-06: Glaucoma suspect, bilateral      Past Medical History:   Diagnosis Date     Glaucoma        Social History     Tobacco Use     Smoking status: Never Smoker     Smokeless tobacco: Never Used   Substance Use Topics     Alcohol use: Not on file       Review of Systems   Constitutional: Negative for fever.   HENT: Positive for congestion and hearing loss. Negative for ear discharge, ear pain and sore throat.    Respiratory: Negative for cough.        Vitals:    08/16/22 1212   BP: 94/65   BP Location: Right arm   Patient Position: Sitting   Cuff Size: Adult Large   Pulse: 78   Resp: 16   Temp: 98.7  F (37.1  C)   TempSrc: Oral   SpO2: 97%   Weight: 93.1 kg (205 lb 4.8 oz)       Physical Exam  Vitals and nursing note reviewed.   Constitutional:       General: She is not in acute distress.     Appearance: She is not toxic-appearing or diaphoretic.   HENT:      Head: Normocephalic and atraumatic.      Right Ear: Ear canal and external ear normal.      Left Ear: Tympanic  membrane, ear canal and external ear normal.      Ears:      Comments: Tympanic membrane appears dull and bulging.  No severe erythema, but generalized erythema present in the canal and on the tympanic membrane.  Eyes:      Conjunctiva/sclera: Conjunctivae normal.   Pulmonary:      Effort: Pulmonary effort is normal. No respiratory distress.   Neurological:      Mental Status: She is alert.   Psychiatric:         Mood and Affect: Mood normal.         Behavior: Behavior normal.         Thought Content: Thought content normal.         Judgment: Judgment normal.         At the end of the encounter, I discussed results, diagnosis, medications. Discussed red flags for immediate return to clinic/ER, as well as indications for follow up if no improvement. Patient understood and agreed to plan. Patient was stable for discharge.

## 2022-08-16 NOTE — PATIENT INSTRUCTIONS
1) Take antibiotic as prescribed. Take this medication with food to avoid stomach upset.   2) Ibuprofen or Tylenol as needed for fever or pain.  3) Follow up in 7-10 days if not improving, sooner if worsening or other concerns.

## 2022-08-17 ENCOUNTER — TELEPHONE (OUTPATIENT)
Dept: WOUND CARE | Facility: CLINIC | Age: 37
End: 2022-08-17

## 2022-08-17 NOTE — TELEPHONE ENCOUNTER
[facility] Wound    Who is the name of the provider?:  New      What is the location you see this provider at?: [place]    Reason for call:  Patient states she is having abdominal plasty and hernia repair on 8/30.  States she wants to be proactive in arranging care for the surgical wound.    Can we leave a detailed message on this number?  YES

## 2022-08-17 NOTE — TELEPHONE ENCOUNTER
Returned call to patient. She has surgery scheduled 8/30. She is hoping to have an appointment scheduled after surgery in case there are issues with the incision/surgical site. Discussed with patient that appointments are scheduled for patients with wounds but patient was insistent that an appointment be made and she will call and cancel if she does not have a wound. [place] so she wants to have support within the [facility] if needed. Also discussed with patient to reach out to primary care provider for assistance if needed.

## 2022-08-18 NOTE — TELEPHONE ENCOUNTER
FUTURE VISIT INFORMATION      FUTURE VISIT INFORMATION:    Date: 10/26    Time: 9:00am    Location: [facility] [place]  REFERRAL INFORMATION:    Reason for visit/diagnosis  Abdominoplasty, second opinoin    RECORDS REQUESTED FROM:       No recs to collect

## 2022-09-10 ENCOUNTER — HEALTH MAINTENANCE LETTER (OUTPATIENT)
Age: 37
End: 2022-09-10

## 2022-10-25 ENCOUNTER — PATIENT OUTREACH (OUTPATIENT)
Dept: PLASTIC SURGERY | Facility: CLINIC | Age: 37
End: 2022-10-25

## 2022-10-25 NOTE — TELEPHONE ENCOUNTER
Left message for patient to return call to [phone], called about missed appointment for today with Dr Rendon.

## 2022-10-26 ENCOUNTER — PRE VISIT (OUTPATIENT)
Dept: PLASTIC SURGERY | Facility: CLINIC | Age: 37
End: 2022-10-26

## 2022-11-08 NOTE — PROGRESS NOTES
PLASTIC SURGERY HISTORY AND PHYSICAL    Chief Complaint: s/p abdominoplasty, healing concerns  Referring Provider: No ref. provider found      HPI:Queenie is a 37 year old female who presents  S/P abdominoplasty at OSH, concerns about healing.     [place] 8/30, issues with healing L side of abdominal incision that still has scabs. No pain. Wearing ace wrap for compression, not tolerating full compression garment. Not using any lotion/moisturizing, nervous to touch scars. Keeping umbilicus covered. Overall she is happy with her results, just concerned that she isn't completely healed yet.     Pt also mentions that she has HS and had a reaction to secret deodorant. Has been avoiding shaving axillae.     PMH:   Past Medical History:   Diagnosis Date     Glaucoma    HS    PSH:   Past Surgical History:   Procedure Laterality Date     LASIK BILATERAL Bilateral 03/02/10       FH:   Family History   Problem Relation Age of Onset     Diabetes No family hx of      Glaucoma No family hx of      Macular Degeneration No family hx of      Amblyopia No family hx of      Retinal detachment No family hx of      Thyroid Disease No family hx of      Melanoma No family hx of      Skin Cancer No family hx of         SH:   Social History     Tobacco Use     Smoking status: Never     Smokeless tobacco: Never        MEDS:     Current Outpatient Medications:      benzoyl peroxide 5 % external liquid, Use daily as a body wash as directed., Disp: 226 g, Rfl: 11     chlorhexidine (HIBICLENS) 4 % liquid, Apply topically daily, Disp: 473 mL, Rfl: 5     Cholecalciferol (VITAMIN D HIGH POTENCY) 25 MCG (1000 UT) CAPS, , Disp: , Rfl:      clindamycin (CLEOCIN T) 1 % external lotion, Apply twice daily to any inflamed bumps on the skin as needed., Disp: 60 mL, Rfl: 3     Cyanocobalamin (VITAMIN B-12 PO), , Disp: , Rfl:      hydrocortisone 2.5 % ointment, Apply topically 2 times daily For 1 week at a time.  Then switch to protopic., Disp: 30 g, Rfl: 0     " hydrocortisone 2.5 % ointment, Apply topically 2 times daily, Disp: 30 g, Rfl: 1     hydrocortisone, Perianal, (HYDROCORTISONE) 2.5 % cream, Apply to inguinal groin area 1-2 times daily as needed, Disp: 30 g, Rfl: 3     MedroxyPROGESTERone Acetate (DEPO-PROVERA IM), , Disp: , Rfl:      spironolactone (ALDACTONE) 100 MG tablet, Take 1 tablet (100 mg) by mouth daily Take 50 mg daily (1/2 pill) at night for 2 weeks.  If no side effects, start taking 100 mg (full pill) at night., Disp: 90 tablet, Rfl: 3     tacrolimus (PROTOPIC) 0.1 % external ointment, Apply topically 2 times daily To areas of dermatitis., Disp: 100 g, Rfl: 11       ALLERGIES:     Allergies   Allergen Reactions     Amoxicillin         ROS: negative except for HPI    PHYSICAL EXAMINATION:   /79   Pulse 103   Temp 98  F (36.7  C) (Oral)   Ht 1.676 m (5' 6\")   Wt 89.3 kg (196 lb 14.4 oz)   SpO2 99%   BMI 31.78 kg/m     BMI: Body mass index is 31.78 kg/m .  General: NAD  Abdomen: abdominal incision well healed to R side and healing in with some scabbing to L side. No open areas. No significant swelling, ecchymosis or erythema. Umbo scar well healed, surrounding skin with hyperpigmentation from tape.     ASSESSMENT: 38 yo F PMH HS presents s/p abdominoplasty in [place] 8/30 with questions about post operative healing.      PLAN:   -overall, she is healing well. Small residual scabbing to L chet abdominal incision but they are very superficial, multiple scabs removed in clinic.   -start moisturizing with aquafor to all incisions.   -start silicone based scar care once all scabs healed  -our instructions for post abdominoplasty are for 4-6 weeks of compression and lifting restrictions. She is well past that jennifer. She can continue to follow her original surgeons advice for extended compression or stop at this time. Ok to slowly increase activity over the next few weeks.   -for axillary HS, recommend warm compress to superficial pustule, ointment " when opens, and follow up as scheduled with her derm  -all questions answered  -RTC PRN    Adamaris Wen PA-C  Plastic and Reconstructive Surgery     25 minutes spent on the date of the encounter doing chart review, history and physical, dressing changes, documentation and further activity as noted above.

## 2022-11-11 ENCOUNTER — OFFICE VISIT (OUTPATIENT)
Dept: PLASTIC SURGERY | Facility: CLINIC | Age: 37
End: 2022-11-11
Payer: COMMERCIAL

## 2022-11-11 VITALS
SYSTOLIC BLOOD PRESSURE: 115 MMHG | OXYGEN SATURATION: 99 % | BODY MASS INDEX: 31.64 KG/M2 | HEIGHT: 66 IN | HEART RATE: 103 BPM | WEIGHT: 196.9 LBS | TEMPERATURE: 98 F | DIASTOLIC BLOOD PRESSURE: 79 MMHG

## 2022-11-11 DIAGNOSIS — Z98.890 S/P ABDOMINOPLASTY: Primary | ICD-10-CM

## 2022-11-11 PROCEDURE — 99202 OFFICE O/P NEW SF 15 MIN: CPT | Performed by: PHYSICIAN ASSISTANT

## 2022-11-11 ASSESSMENT — PAIN SCALES - GENERAL: PAINLEVEL: NO PAIN (0)

## 2022-11-11 NOTE — NURSING NOTE
"Chief Complaint   Patient presents with     Consult     Hx of OOS abdominoplasty -- healing concerns       Vitals:    11/11/22 0903   BP: 115/79   Pulse: 103   Temp: 98  F (36.7  C)   TempSrc: Oral   SpO2: 99%   Weight: 89.3 kg (196 lb 14.4 oz)   Height: 1.676 m (5' 6\")       Body mass index is 31.78 kg/m .          NAV RUVALCABA EMT    "

## 2022-11-11 NOTE — LETTER
11/11/2022       RE: Queenie Rutledge  [address] [address]     Dear Colleague,    Thank you for referring your patient, Queenie Rutledge, to the [facility] PLASTIC AND RECONSTRUCTIVE SURGERY CLINIC [place] at [facility]. Please see a copy of my visit note below.    PLASTIC SURGERY HISTORY AND PHYSICAL    Chief Complaint: s/p abdominoplasty, healing concerns  Referring Provider: No ref. provider found      HPI:Queenie is a 37 year old female who presents  S/P abdominoplasty at OSH, concerns about healing.     [place] 8/30, issues with healing L side of abdominal incision that still has scabs. No pain. Wearing ace wrap for compression, not tolerating full compression garment. Not using any lotion/moisturizing, nervous to touch scars. Keeping umbilicus covered. Overall she is happy with her results, just concerned that she isn't completely healed yet.     Pt also mentions that she has HS and had a reaction to secret deodorant. Has been avoiding shaving axillae.     PMH:   Past Medical History:   Diagnosis Date     Glaucoma    HS    PSH:   Past Surgical History:   Procedure Laterality Date     LASIK BILATERAL Bilateral 03/02/10       FH:   Family History   Problem Relation Age of Onset     Diabetes No family hx of      Glaucoma No family hx of      Macular Degeneration No family hx of      Amblyopia No family hx of      Retinal detachment No family hx of      Thyroid Disease No family hx of      Melanoma No family hx of      Skin Cancer No family hx of         SH:   Social History     Tobacco Use     Smoking status: Never     Smokeless tobacco: Never        MEDS:     Current Outpatient Medications:      benzoyl peroxide 5 % external liquid, Use daily as a body wash as directed., Disp: 226 g, Rfl: 11     chlorhexidine (HIBICLENS) 4 % liquid, Apply topically daily, Disp: 473 mL, Rfl: 5     Cholecalciferol (VITAMIN D HIGH POTENCY) 25 MCG (1000 UT)  "CAPS, , Disp: , Rfl:      clindamycin (CLEOCIN T) 1 % external lotion, Apply twice daily to any inflamed bumps on the skin as needed., Disp: 60 mL, Rfl: 3     Cyanocobalamin (VITAMIN B-12 PO), , Disp: , Rfl:      hydrocortisone 2.5 % ointment, Apply topically 2 times daily For 1 week at a time.  Then switch to protopic., Disp: 30 g, Rfl: 0     hydrocortisone 2.5 % ointment, Apply topically 2 times daily, Disp: 30 g, Rfl: 1     hydrocortisone, Perianal, (HYDROCORTISONE) 2.5 % cream, Apply to inguinal groin area 1-2 times daily as needed, Disp: 30 g, Rfl: 3     MedroxyPROGESTERone Acetate (DEPO-PROVERA IM), , Disp: , Rfl:      spironolactone (ALDACTONE) 100 MG tablet, Take 1 tablet (100 mg) by mouth daily Take 50 mg daily (1/2 pill) at night for 2 weeks.  If no side effects, start taking 100 mg (full pill) at night., Disp: 90 tablet, Rfl: 3     tacrolimus (PROTOPIC) 0.1 % external ointment, Apply topically 2 times daily To areas of dermatitis., Disp: 100 g, Rfl: 11       ALLERGIES:     Allergies   Allergen Reactions     Amoxicillin         ROS: negative except for HPI    PHYSICAL EXAMINATION:   /79   Pulse 103   Temp 98  F (36.7  C) (Oral)   Ht 1.676 m (5' 6\")   Wt 89.3 kg (196 lb 14.4 oz)   SpO2 99%   BMI 31.78 kg/m     BMI: Body mass index is 31.78 kg/m .  General: NAD  Abdomen: abdominal incision well healed to R side and healing in with some scabbing to L side. No open areas. No significant swelling, ecchymosis or erythema. Umbo scar well healed, surrounding skin with hyperpigmentation from tape.     ASSESSMENT: 38 yo F PMH HS presents s/p abdominoplasty in [place] 8/30 with questions about post operative healing.      PLAN:   -overall, she is healing well. Small residual scabbing to L chet abdominal incision but they are very superficial, multiple scabs removed in clinic.   -start moisturizing with aquafor to all incisions.   -start silicone based scar care once all scabs healed  -our instructions for " post abdominoplasty are for 4-6 weeks of compression and lifting restrictions. She is well past that jennifer. She can continue to follow her original surgeons advice for extended compression or stop at this time. Ok to slowly increase activity over the next few weeks.   -for axillary HS, recommend warm compress to superficial pustule, ointment when opens, and follow up as scheduled with her derm  -all questions answered  -RTC PRN    Adamaris Wen PA-C  Plastic and Reconstructive Surgery     25 minutes spent on the date of the encounter doing chart review, history and physical, dressing changes, documentation and further activity as noted above.

## 2022-11-17 ENCOUNTER — OFFICE VISIT (OUTPATIENT)
Dept: DERMATOLOGY | Facility: CLINIC | Age: 37
End: 2022-11-17
Payer: COMMERCIAL

## 2022-11-17 DIAGNOSIS — L73.2 HIDRADENITIS SUPPURATIVA: ICD-10-CM

## 2022-11-17 PROCEDURE — 11900 INJECT SKIN LESIONS </W 7: CPT | Mod: GC | Performed by: DERMATOLOGY

## 2022-11-17 RX ORDER — CLINDAMYCIN PHOSPHATE 10 UG/ML
LOTION TOPICAL
Qty: 60 ML | Refills: 3 | Status: SHIPPED | OUTPATIENT
Start: 2022-11-17 | End: 2024-02-29

## 2022-11-17 RX ORDER — SPIRONOLACTONE 100 MG/1
100 TABLET, FILM COATED ORAL DAILY
Qty: 90 TABLET | Refills: 3 | Status: SHIPPED | OUTPATIENT
Start: 2022-11-17 | End: 2023-10-09

## 2022-11-17 ASSESSMENT — PAIN SCALES - GENERAL: PAINLEVEL: NO PAIN (0)

## 2022-11-17 NOTE — NURSING NOTE
Chief Complaint   Patient presents with     Derm Problem     HS follow up. Pt reports she has been very good. But pt has an areas of concern on armpits and states she thinks a nodule may be forming on R inner thigh.     Israel Gutierrez, EMT

## 2022-11-17 NOTE — NURSING NOTE
Drug Administration Record    Prior to injection, verified patient identity using patient's name and date of birth.  Due to injection administration, patient instructed to remain in clinic for 15 minutes  afterwards, and to report any adverse reaction to me immediately.    Drug Name: triamcinolone acetonide(kenalog)  Dose: 1mL of triamcinolone 10mg/mL, 10mg dose  Route administered: ID  NDC #: Kenalog-10 (7142-1456-01)  Amount of waste(mL): 4mL  Reason for waste: Multi dose vial    LOT #: [id number]  SITE: See provider notes  : [email or web address]  EXPIRATION DATE: 04/2024

## 2022-11-17 NOTE — PROGRESS NOTES
[facility] Dermatology Note  Encounter Date: Nov 17, 2022  Office Visit     Dermatology Problem List:  *ISABELA CC*   1. Mild hidradenitis suppurativa  - axilla, groin. Favor HS due to history of pilonidal cyst.  - Current tx: spironolactone 100 mg every day (started 12/2021), Hibiclens 4% wash daily, Clindamycin 1% BID when flares, birth control (depo inj), LHR with Dr. Valencia   - Previous tx: doxycycline 100mg po BID x10 days with flares, BPO 5% wash, hx of I&D   - Future considerations: Humira, isotretinoin, excision  2. Dermatitis - bilateral areolae - desonide 0.05% cream  3. Intertrigo -  hydrocortisone 2.5% cream, keto 2% cream  4. Hypopigmented macule, possible PIH, L thigh -  hydrocortisone 2.5% cream  5. Eyelid dermatitis 2/2 irritation, HC 2.5% ointment, protopic  6. Skin tags, LN      Social: Would like liposuction, but unable to schedule due to ongoing open lesions 2/2 HS   ____________________________________________    Assessment & Plan:     # Hidradenitis suppurative, Lopez stage I. Flare today in the right upper medial posterior thigh, injected today with IL-K.     - IL-K injection today (see procedure note below)   - Continue daily baths with hibiclens   - Continue spironolactone 100mg daily   - Continue clindamycin lotion BID prn (refill sent)  - Continue LHR with Dr. Valencia - will be starting axillary hair removal in June   - Future considerations: Humira, isotretinoin, excision     Procedures Performed:   - Intra-lesional triamcinolone procedure note. After positioning and cleansing with isopropyl alcohol, 0.5 total mL of triamcinolone 10 mg/mL was injected into 1 lesion(s) on the site listed above. The patient tolerated the procedure well and left the dermatology clinic in good condition.    Follow-up: 3 months with me     Staff and Resident:     Irene Nelson MD     The patient was seen and staffed with Dr. Gabbie MD   I, Cuca Cartwright MD, saw this patient  with the resident and agree with the resident s findings and plan of care as documented in the resident s note. I was present for the entire procedure.   ____________________________________________    CC: Derm Problem (HS follow up. Pt reports she has been very good. But pt has an areas of concern on armpits and states she thinks a nodule may be forming on R inner thigh.)    HPI:  Ms. Queenie Rutlegde is a(n) 37 year old female who presents today as a return patient for follow-up of hidradenitis suppurativa. She has one papulonodule beginning to come up on the upper medial posterior thigh. It is becoming painful but reluctant to inject b/c last time did not have a good experience. Other than this her disease has been relatively quiescent. She had an abdominoplasty a few weeks ago which she is healing from. She is also on hold for LHR with dan until she is totally healed.     Labs Reviewed:  N/A    Physical Exam:  Vitals: There were no vitals taken for this visit.  SKIN: Focused examination of abdomen and groin was performed.  - tender inflammatory nodule in the upper inner posterior thigh   - no other lesions of concern on areas examined.     Medications:  Current Outpatient Medications   Medication     benzoyl peroxide 5 % external liquid     chlorhexidine (HIBICLENS) 4 % liquid     Cholecalciferol (VITAMIN D HIGH POTENCY) 25 MCG (1000 UT) CAPS     clindamycin (CLEOCIN T) 1 % external lotion     Cyanocobalamin (VITAMIN B-12 PO)     hydrocortisone 2.5 % ointment     hydrocortisone 2.5 % ointment     hydrocortisone, Perianal, (HYDROCORTISONE) 2.5 % cream     MedroxyPROGESTERone Acetate (DEPO-PROVERA IM)     spironolactone (ALDACTONE) 100 MG tablet     tacrolimus (PROTOPIC) 0.1 % external ointment     No current facility-administered medications for this visit.      Past Medical History:   Patient Active Problem List   Diagnosis     Glaucoma suspect     Glaucoma suspect, bilateral     Past Medical History:    Diagnosis Date     Glaucoma

## 2022-11-17 NOTE — LETTER
11/17/2022       RE: Queenie Rutledge  [address] [id number]     Dear Colleague,    Thank you for referring your patient, Queenie Rutledge, to the [facility] DERMATOLOGY CLINIC [place] at [facility]. Please see a copy of my visit note below.    [facility] Dermatology Note  Encounter Date: Nov 17, 2022  Office Visit     Dermatology Problem List:  *ISABELA CC*   1. Mild hidradenitis suppurativa  - axilla, groin. Favor HS due to history of pilonidal cyst.  - Current tx: spironolactone 100 mg every day (started 12/2021), Hibiclens 4% wash daily, Clindamycin 1% BID when flares, birth control (depo inj), LHR with Dr. Valencia   - Previous tx: doxycycline 100mg po BID x10 days with flares, BPO 5% wash, hx of I&D   - Future considerations: Humira, isotretinoin, excision  2. Dermatitis - bilateral areolae - desonide 0.05% cream  3. Intertrigo -  hydrocortisone 2.5% cream, keto 2% cream  4. Hypopigmented macule, possible PIH, L thigh -  hydrocortisone 2.5% cream  5. Eyelid dermatitis 2/2 irritation, HC 2.5% ointment, protopic  6. Skin tags, LN      Social: Would like liposuction, but unable to schedule due to ongoing open lesions 2/2 HS   ____________________________________________    Assessment & Plan:     # Hidradenitis suppurative, Lopez stage I. Flare today in the right upper medial posterior thigh, injected today with IL-K.     - IL-K injection today (see procedure note below)   - Continue daily baths with hibiclens   - Continue spironolactone 100mg daily   - Continue clindamycin lotion BID prn (refill sent)  - Continue LHR with Dr. Valencia - will be starting axillary hair removal in June   - Future considerations: Humira, isotretinoin, excision     Procedures Performed:   - Intra-lesional triamcinolone procedure note. After positioning and cleansing with isopropyl alcohol, 0.5 total mL of triamcinolone 10 mg/mL was injected  into 1 lesion(s) on the site listed above. The patient tolerated the procedure well and left the dermatology clinic in good condition.    Follow-up: 3 months with me     Staff and Resident:     Irene Nelson MD     The patient was seen and staffed with Dr. Gabbie MD   I, Cuca Cartwright MD, saw this patient with the resident and agree with the resident s findings and plan of care as documented in the resident s note. I was present for the entire procedure.   ____________________________________________    CC: Derm Problem (HS follow up. Pt reports she has been very good. But pt has an areas of concern on armpits and states she thinks a nodule may be forming on R inner thigh.)    HPI:  Ms. Queenie Rutledge is a(n) 37 year old female who presents today as a return patient for follow-up of hidradenitis suppurativa. She has one papulonodule beginning to come up on the upper medial posterior thigh. It is becoming painful but reluctant to inject b/c last time did not have a good experience. Other than this her disease has been relatively quiescent. She had an abdominoplasty a few weeks ago which she is healing from. She is also on hold for LHR with [facility] until she is totally healed.     Labs Reviewed:  N/A    Physical Exam:  Vitals: There were no vitals taken for this visit.  SKIN: Focused examination of abdomen and groin was performed.  - tender inflammatory nodule in the upper inner posterior thigh   - no other lesions of concern on areas examined.     Medications:  Current Outpatient Medications   Medication     benzoyl peroxide 5 % external liquid     chlorhexidine (HIBICLENS) 4 % liquid     Cholecalciferol (VITAMIN D HIGH POTENCY) 25 MCG (1000 UT) CAPS     clindamycin (CLEOCIN T) 1 % external lotion     Cyanocobalamin (VITAMIN B-12 PO)     hydrocortisone 2.5 % ointment     hydrocortisone 2.5 % ointment     hydrocortisone, Perianal, (HYDROCORTISONE) 2.5 % cream     MedroxyPROGESTERone Acetate (DEPO-PROVERA  IM)     spironolactone (ALDACTONE) 100 MG tablet     tacrolimus (PROTOPIC) 0.1 % external ointment     No current facility-administered medications for this visit.      Past Medical History:   Patient Active Problem List   Diagnosis     Glaucoma suspect     Glaucoma suspect, bilateral     Past Medical History:   Diagnosis Date     Glaucoma

## 2023-01-22 ENCOUNTER — HEALTH MAINTENANCE LETTER (OUTPATIENT)
Age: 38
End: 2023-01-22

## 2023-02-13 ENCOUNTER — TELEPHONE (OUTPATIENT)
Dept: DERMATOLOGY | Facility: CLINIC | Age: 38
End: 2023-02-13
Payer: COMMERCIAL

## 2023-06-08 ENCOUNTER — OFFICE VISIT (OUTPATIENT)
Dept: DERMATOLOGY | Facility: CLINIC | Age: 38
End: 2023-06-08
Payer: COMMERCIAL

## 2023-06-08 ENCOUNTER — LAB (OUTPATIENT)
Dept: LAB | Facility: CLINIC | Age: 38
End: 2023-06-08
Payer: COMMERCIAL

## 2023-06-08 VITALS — WEIGHT: 214.9 LBS | SYSTOLIC BLOOD PRESSURE: 110 MMHG | BODY MASS INDEX: 34.69 KG/M2 | DIASTOLIC BLOOD PRESSURE: 70 MMHG

## 2023-06-08 DIAGNOSIS — E66.9 OBESITY, UNSPECIFIED CLASSIFICATION, UNSPECIFIED OBESITY TYPE, UNSPECIFIED WHETHER SERIOUS COMORBIDITY PRESENT: Primary | ICD-10-CM

## 2023-06-08 DIAGNOSIS — L73.2 HIDRADENITIS SUPPURATIVA: ICD-10-CM

## 2023-06-08 LAB
CRP SERPL-MCNC: <3 MG/L
HBA1C MFR BLD: 5.3 %

## 2023-06-08 PROCEDURE — 82306 VITAMIN D 25 HYDROXY: CPT | Performed by: DERMATOLOGY

## 2023-06-08 PROCEDURE — 99000 SPECIMEN HANDLING OFFICE-LAB: CPT | Performed by: PATHOLOGY

## 2023-06-08 PROCEDURE — 86231 EMA EACH IG CLASS: CPT | Mod: 90 | Performed by: PATHOLOGY

## 2023-06-08 PROCEDURE — 36415 COLL VENOUS BLD VENIPUNCTURE: CPT | Performed by: PATHOLOGY

## 2023-06-08 PROCEDURE — 86140 C-REACTIVE PROTEIN: CPT | Performed by: PATHOLOGY

## 2023-06-08 PROCEDURE — 86364 TISS TRNSGLTMNASE EA IG CLAS: CPT | Performed by: DERMATOLOGY

## 2023-06-08 PROCEDURE — 86258 DGP ANTIBODY EACH IG CLASS: CPT | Performed by: DERMATOLOGY

## 2023-06-08 PROCEDURE — 83036 HEMOGLOBIN GLYCOSYLATED A1C: CPT | Performed by: DERMATOLOGY

## 2023-06-08 PROCEDURE — 99214 OFFICE O/P EST MOD 30 MIN: CPT | Performed by: DERMATOLOGY

## 2023-06-08 RX ORDER — DOXYCYCLINE 100 MG/1
100 CAPSULE ORAL 2 TIMES DAILY
Qty: 28 CAPSULE | Refills: 4 | Status: SHIPPED | OUTPATIENT
Start: 2023-06-08 | End: 2023-08-03

## 2023-06-08 RX ORDER — RESORCINOL
POWDER (GRAM) MISCELLANEOUS
Qty: 30 G | Refills: 11 | Status: SHIPPED | OUTPATIENT
Start: 2023-06-08 | End: 2023-09-28

## 2023-06-08 NOTE — PATIENT INSTRUCTIONS
Hidradentitis suppurativa  - Continue hibilcens wash  - Start ozempic as below  - Doxycycline 100mg twice a day as needed for alfres  - Resorcinol cream as needed for flares (set to chemistry[facility])  [facility]  [facility]   30 g = $68  60g = $95   Phone #: [phone]    Acanthosis nigricans  -  Due to insulin resistance  - Will check hgba1c    Obesity  - Start ozempic 0.25mg injection once a week for 4 weeks  - Then ioncrease to 0.5mg injection weekly x4 weeks   - Then increase to 1mg weekly for 4 weeks   - Then increase to 2mg weekly indefinitely   - Weight management rral     Hyperpigmentation  - Hydroquinone topical at night  - Topical sunscreen lotion during the day SPF 30 or higher    Low B12  - Labs today for celiac  - Will consider GI referral in future

## 2023-06-08 NOTE — PROGRESS NOTES
[facility] Dermatology Note  Encounter Date: Jun 8, 2023  Office Visit     Dermatology Problem List:  1. Mild hidradenitis suppurativa    - axilla, groin. Favor HS due to history of pilonidal cyst.  - Current tx: spironolactone 100 mg every day (started 12/2021), Hibiclens 4% wash daily, Clindamycin 1% BID when flares, birth control (depo inj), LHR with Dr. Valencia   - Previous tx: doxycycline 100mg po BID x10 days with flares, BPO 5% wash, hx of I&D   - Future considerations: Humira, isotretinoin, excision  2. Dermatitis - bilateral areolae - desonide 0.05% cream  3. Intertrigo -  hydrocortisone 2.5% cream, keto 2% cream  4. Eyelid dermatitis 2/2 irritation, HC 2.5% ointment, protopic  5. Skin tags, LN     ____________________________________________    Assessment & Plan:  # Hidradenitis suppurative, Lopez stage I.   - No active flares today  - Labs today: Vitamin D, celiac, A1C, CRP    - Continue daily baths with hibiclens   - Refill spironolactone to have on hand if would like to take   - Resorcinol cream for flares   - Order doxycycline (100 mg twice a day) for flares   - Continue clindamycin lotion BID prn  - Discussed laser hair removal   - Starting Ozempic (semaglutide) may also help with the HS with clinical signs of insulin resistance.     # Acanthosis nigricans    - Check A1C     - Start Metformin     - Discussed Ozempic (semaglutide) and weight loss; she interested in this; to start after approval          - 0.25 mg injected once weekly for 4 weeks        - Move up to 0.5 mg once weekly       - Can be increased every 4 weeks up to 2 mg once weekly   - Referral to weight loss management clinic    #Hyperpigmentation of face and ears   - Does seem to be photo distributed.   - Discussed sun protection    - ear involvement is not consistent with melasma. Reminiscent of systemic metabolic conditions such as addisons. No systemic symptoms consistent with Addisons. Does have history of B12  "deficiency. Will evaluate for celiac and absorption issue and consider GI referral.   - Hydroquinone 8%  + kojic acid through skin medicials    Follow-up:  3 months via phone    Staff and Scribe:     I, Candi Bello, am serving as a medical student to document services personally performed by Jay May MD based on data collection and the provider's statements to me    Staff Physician:  I was present with the medical student who participated in the service and in the documentation of the note. I have verified the history and personally performed the physical exam and medical decision making. I agree with the assessment and plan of care as documented in the note.     Jay May MD    Department of Dermatology  [facility]: Phone: [phone], Fax: [phone]  6/25/2023        ____________________________________________    CC: Derm Problem (Queenie is following up for HS. L axilla irritated which is new for her. Also noted a lesion on back of neck at hairline. )    HPI:  Ms. Queenie Rutledge is a(n) 38 year old female who presents today as a return patient for HS.    She says that the inner thigh area (typical area), does not have much involvement in the last 3 months.     She describes new involvement in the left axilla as a \"purple swollen band-like irritation in the left axilla without drainage,\" which started in the last year. Pain worsened with water exposure and deodorant use, and improved when discontinued. She has been trying new deodorant (Dove 0% aluminum, Secret 0% aluminum), but she would like to know what recommendations can be made for deodorant, as the smell bothers her. She feels that shaving may be impacting the flare (in addition to deodorant) negatively. She has noticed more sweating recently, but thinks this may be related to decreased deodorant use. Currently at a 2 out of 10 for pain, and last " week 0 out of 10     She noticed a new lesion in the back of her neck in the last few months. She tried neosporin, which initially helped, but it now has come back. She says it is irritated, although not painful, and no longer can wear her necklaces. She says this worsens when hot, and improves when clean and dry.    In the last 6 months she also noticed a new lesion in inframammary area, but this is not currently painful.       She is currently using hibiclens, which helps. She reports using spironolactone inconsistently since 8/2022, when she had her surgery. She has discontinued use in the past month, and has noticed flaring since then. When she was using spironolactone regularly, she felt that the flares were still present. She has only had to use clindamycin 1% once in the past 6 months, and has not used it for the axilla. She started the depo shots again in April 2023 and is tolerating it well. She reports that LHR has been very helpful and reduced nodules      She would be open to ILK if needed today, and is also interested in getting hyperpigmentation on ears and face checked out       She notes having a sensitive stomach, although she has been having regular bowel movements without blood or fat in the stool.     Patient is otherwise feeling well, without additional skin concerns.      HS Nurse Assessment        2/15/2021     9:19 AM 8/26/2021     9:19 AM   Nurse Assessment Data   Over the past 30 days how many new lesions did you get? 2 2   Over the past week, how many dressing changes do you do each day? 1 0   Over the past week, has your wound drainage been: Mild None   Rate your HS overall from 0 - 10 (0 = no disease, 10 = worst) over the past week:  0 8   Rate your pain score from 0 - 10 (0 = no disease, 10 = worst) for the most painful/symptomatic lesion in the past week:  7 8   Over the past week, how much has HS influenced your quality of life? 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    Skin Exam:   - Velvety hyperpigmented lichenified plaque on dorsal neck  -Two tender hyperpigmented patches in the left axilla with no sinus tracts or nodules appreciated  -White atrophic linear scar on the left and right inner thighj  -Nontender uniform brown discoid well marcated macule in the inframammary region.  -  Hyperpigmented patches on cheeks and also noted on helical rim.   - No other lesions of concern on areas examined.     Medications:  Current Outpatient Medications   Medication     benzoyl peroxide 5 % external liquid     chlorhexidine (HIBICLENS) 4 % liquid     Cholecalciferol (VITAMIN D HIGH POTENCY) 25 MCG (1000 UT) CAPS     clindamycin (CLEOCIN T) 1 % external lotion     Cyanocobalamin (VITAMIN B-12 PO)     hydrocortisone 2.5 % ointment     hydrocortisone 2.5 % ointment     hydrocortisone, Perianal, (HYDROCORTISONE) 2.5 % cream     MedroxyPROGESTERone Acetate (DEPO-PROVERA IM)     spironolactone (ALDACTONE) 100 MG tablet     tacrolimus (PROTOPIC) 0.1 % external ointment     No current facility-administered medications for this visit.      Past Medical History:   Patient Active Problem List   Diagnosis     Glaucoma suspect     Glaucoma suspect, bilateral     Past Medical History:   Diagnosis Date     Glaucoma

## 2023-06-08 NOTE — LETTER
6/8/2023       RE: Queenie Rutledge  [address] [address]     Dear Colleague,    Thank you for referring your patient, Queenie Rutledge, to the [facility] DERMATOLOGY [facility]INIC [place] at [facility]. Please see a copy of my visit note below.    [facility] Dermatology Note  Encounter Date: Jun 8, 2023  Office Visit     Dermatology Problem List:  1. Mild hidradenitis suppurativa    - axilla, groin. Favor HS due to history of pilonidal cyst.  - Current tx: spironolactone 100 mg every day (started 12/2021), Hibiclens 4% wash daily, Clindamycin 1% BID when flares, birth control (depo inj), LHR with Dr. Valencia   - Previous tx: doxycycline 100mg po BID x10 days with flares, BPO 5% wash, hx of I&D   - Future considerations: Humira, isotretinoin, excision  2. Dermatitis - bilateral areolae - desonide 0.05% cream  3. Intertrigo -  hydrocortisone 2.5% cream, keto 2% cream  4. Eyelid dermatitis 2/2 irritation, HC 2.5% ointment, protopic  5. Skin tags, LN     ____________________________________________    Assessment & Plan:  # Hidradenitis suppurative, Lopez stage I.   - No active flares today  - Labs today: Vitamin D, celiac, A1C, CRP    - Continue daily baths with hibiclens   - Refill spironolactone to have on hand if would like to take   - Resorcinol cream for flares   - Order doxycycline (100 mg twice a day) for flares   - Continue clindamycin lotion BID prn  - Discussed laser hair removal   - Starting Ozempic (semaglutide) may also help with the HS with clinical signs of insulin resistance.     # Acanthosis nigricans    - Check A1C     - Start Metformin     - Discussed Ozempic (semaglutide) and weight loss; she interested in this; to start after approval          - 0.25 mg injected once weekly for 4 weeks        - Move up to 0.5 mg once weekly       - Can be increased every 4 weeks up to 2 mg once weekly   - Referral to weight loss  "management clinic    #Hyperpigmentation of face and ears   - Does seem to be photo distributed.   - Discussed sun protection    - ear involvement is not consistent with melasma. Reminiscent of systemic metabolic conditions such as addisons. No systemic symptoms consistent with Addisons. Does have history of B12 deficiency. Will evaluate for celiac and absorption issue and consider GI referral.   - Hydroquinone 8%  + kojic acid through skin medicials    Follow-up:  3 months via phone    Staff and Scribe:     I, Candi Bello, am serving as a medical student to document services personally performed by Jay May MD based on data collection and the provider's statements to me    Staff Physician:  I was present with the medical student who participated in the service and in the documentation of the note. I have verified the history and personally performed the physical exam and medical decision making. I agree with the assessment and plan of care as documented in the note.     Jay May MD    Department of Dermatology  [facility]: Phone: [phone], Fax: [phone]  6/25/2023        ____________________________________________    CC: Derm Problem (Queenie is following up for HS. L axilla irritated which is new for her. Also noted a lesion on back of neck at hairline. )    HPI:  Ms. Queenie Rutledge is a(n) 38 year old female who presents today as a return patient for HS.    She says that the inner thigh area (typical area), does not have much involvement in the last 3 months.     She describes new involvement in the left axilla as a \"purple swollen band-like irritation in the left axilla without drainage,\" which started in the last year. Pain worsened with water exposure and deodorant use, and improved when discontinued. She has been trying new deodorant (Dove 0% aluminum, Secret 0% aluminum), but she would like to " know what recommendations can be made for deodorant, as the smell bothers her. She feels that shaving may be impacting the flare (in addition to deodorant) negatively. She has noticed more sweating recently, but thinks this may be related to decreased deodorant use. Currently at a 2 out of 10 for pain, and last week 0 out of 10     She noticed a new lesion in the back of her neck in the last few months. She tried neosporin, which initially helped, but it now has come back. She says it is irritated, although not painful, and no longer can wear her necklaces. She says this worsens when hot, and improves when clean and dry.    In the last 6 months she also noticed a new lesion in inframammary area, but this is not currently painful.       She is currently using hibiclens, which helps. She reports using spironolactone inconsistently since 8/2022, when she had her surgery. She has discontinued use in the past month, and has noticed flaring since then. When she was using spironolactone regularly, she felt that the flares were still present. She has only had to use clindamycin 1% once in the past 6 months, and has not used it for the axilla. She started the depo shots again in April 2023 and is tolerating it well. She reports that LHR has been very helpful and reduced nodules      She would be open to ILK if needed today, and is also interested in getting hyperpigmentation on ears and face checked out       She notes having a sensitive stomach, although she has been having regular bowel movements without blood or fat in the stool.     Patient is otherwise feeling well, without additional skin concerns.      HS Nurse Assessment        2/15/2021     9:19 AM 8/26/2021     9:19 AM   Nurse Assessment Data   Over the past 30 days how many new lesions did you get? 2 2   Over the past week, how many dressing changes do you do each day? 1 0   Over the past week, has your wound drainage been: Mild None   Rate your HS overall from 0  - 10 (0 = no disease, 10 = worst) over the past week:  0 8   Rate your pain score from 0 - 10 (0 = no disease, 10 = worst) for the most painful/symptomatic lesion in the past week:  7 8   Over the past week, how much has HS influenced your quality of life? slightly very much     Social History:  She is currently a clinical supervisor at [employer]     Labs Reviewed:  -Discussed prior A1C result (8/13/2021)     Physical Exam:  Vitals: /70   Wt 97.5 kg (214 lb 14.4 oz)   BMI 34.69 kg/m    SKIN: Full skin, which includes the head/face, both arms, chest, both legs, genitalia and/or groin buttockswas examined.    Skin Exam:   - Velvety hyperpigmented lichenified plaque on dorsal neck  -Two tender hyperpigmented patches in the left axilla with no sinus tracts or nodules appreciated  -White atrophic linear scar on the left and right inner thighj  -Nontender uniform brown discoid well marcated macule in the inframammary region.  -  Hyperpigmented patches on cheeks and also noted on helical rim.   - No other lesions of concern on areas examined.     Medications:  Current Outpatient Medications   Medication    benzoyl peroxide 5 % external liquid    chlorhexidine (HIBICLENS) 4 % liquid    Cholecalciferol (VITAMIN D HIGH POTENCY) 25 MCG (1000 UT) CAPS    clindamycin (CLEOCIN T) 1 % external lotion    Cyanocobalamin (VITAMIN B-12 PO)    hydrocortisone 2.5 % ointment    hydrocortisone 2.5 % ointment    hydrocortisone, Perianal, (HYDROCORTISONE) 2.5 % cream    MedroxyPROGESTERone Acetate (DEPO-PROVERA IM)    spironolactone (ALDACTONE) 100 MG tablet    tacrolimus (PROTOPIC) 0.1 % external ointment     No current facility-administered medications for this visit.      Past Medical History:   Patient Active Problem List   Diagnosis    Glaucoma suspect    Glaucoma suspect, bilateral     Past Medical History:   Diagnosis Date    Glaucoma             Again, thank you for allowing me to participate in the care of your patient.       Sincerely,    Jay May MD

## 2023-06-08 NOTE — NURSING NOTE
Dermatology Rooming Note    Queenie Rutledge's goals for this visit include:   Chief Complaint   Patient presents with     Derm Problem     Queenie is following up for HS. L axilla irritated which is new for her. Also noted a lesion on back of neck at hairline.

## 2023-06-09 LAB
DEPRECATED CALCIDIOL+CALCIFEROL SERPL-MC: 23 UG/L (ref 20–75)
GLIADIN IGA SER-ACNC: 1.1 U/ML
GLIADIN IGG SER-ACNC: <0.6 U/ML
TTG IGA SER-ACNC: 0.4 U/ML

## 2023-06-11 LAB — ENDOMYSIUM IGA TITR SER IF: NORMAL {TITER}

## 2023-06-12 ENCOUNTER — TELEPHONE (OUTPATIENT)
Dept: DERMATOLOGY | Facility: CLINIC | Age: 38
End: 2023-06-12
Payer: COMMERCIAL

## 2023-06-12 NOTE — TELEPHONE ENCOUNTER
Prior Authorization Retail Medication Request    Medication/Dose: semaglutide (OZEMPIC) 2 MG/3ML pen  ICD code (if different than what is on RX):    Obesity, unspecified classification, unspecified obesity type, unspecified whether serious comorbidity present [E66.9]  - Primary       Hidradenitis suppurativa [L73.2]       Previously Tried and Failed:  SEE CHART  Rationale:      Insurance Name:  [employer]  Insurance ID:  [id number]      Hernández: [id number]

## 2023-06-16 NOTE — TELEPHONE ENCOUNTER
PRIOR AUTHORIZATION DENIED    Medication: OZEMPIC (0.25 OR 0.5 MG/DOSE) [facility]    Insurance Company: [employer] - Phone [phone] Fax [phone]    Denial Date: 6/16/2023    Denial Rational: Patient is not using to treat type 2 diabetes.     Appeal Information:

## 2023-06-16 NOTE — TELEPHONE ENCOUNTER
PA Initiation    Medication: OZEMPIC (0.25 OR 0.5 MG/DOSE) 2 MG/3ML SC [facility]  Insurance Company: [employer] - Phone [phone] Fax [phone]  Pharmacy Filling the Rx: [id number] [address] AT [address] [place] [address]  Filling Pharmacy Phone: [phone]  Filling Pharmacy Fax: [phone]  Start Date: 6/16/2023

## 2023-06-24 ENCOUNTER — MYC MEDICAL ADVICE (OUTPATIENT)
Dept: DERMATOLOGY | Facility: CLINIC | Age: 38
End: 2023-06-24
Payer: COMMERCIAL

## 2023-08-01 ENCOUNTER — MYC MEDICAL ADVICE (OUTPATIENT)
Dept: DERMATOLOGY | Facility: CLINIC | Age: 38
End: 2023-08-01
Payer: COMMERCIAL

## 2023-08-03 DIAGNOSIS — B37.31 VAGINAL CANDIDIASIS: ICD-10-CM

## 2023-08-03 DIAGNOSIS — L73.2 HIDRADENITIS SUPPURATIVA: ICD-10-CM

## 2023-08-03 RX ORDER — FLUCONAZOLE 150 MG/1
150 TABLET ORAL ONCE
Qty: 1 TABLET | Refills: 0 | Status: SHIPPED | OUTPATIENT
Start: 2023-08-03 | End: 2023-08-03

## 2023-08-03 RX ORDER — DOXYCYCLINE 100 MG/1
100 CAPSULE ORAL 2 TIMES DAILY
Qty: 28 CAPSULE | Refills: 4 | Status: SHIPPED | OUTPATIENT
Start: 2023-08-03

## 2023-09-28 ENCOUNTER — VIRTUAL VISIT (OUTPATIENT)
Dept: DERMATOLOGY | Facility: CLINIC | Age: 38
End: 2023-09-28
Payer: COMMERCIAL

## 2023-09-28 DIAGNOSIS — E66.9 OBESITY, UNSPECIFIED CLASSIFICATION, UNSPECIFIED OBESITY TYPE, UNSPECIFIED WHETHER SERIOUS COMORBIDITY PRESENT: Primary | ICD-10-CM

## 2023-09-28 DIAGNOSIS — L73.2 HIDRADENITIS SUPPURATIVA: ICD-10-CM

## 2023-09-28 PROCEDURE — 99443 PR PHYSICIAN TELEPHONE EVALUATION 21-30 MIN: CPT | Performed by: DERMATOLOGY

## 2023-09-28 RX ORDER — RESORCINOL
POWDER (GRAM) MISCELLANEOUS
Qty: 30 G | Refills: 11 | Status: SHIPPED | OUTPATIENT
Start: 2023-09-28 | End: 2024-02-29

## 2023-09-28 NOTE — LETTER
9/28/2023       RE: Queenie Rutledge  [address] [address]     Dear Colleague,    Thank you for referring your patient, Queenie Rutledge, to the [facility] DERMATOLOGY [facility] [place] at [facility]. Please see a copy of my visit note below.    [facility] Dermatology Note  Encounter Date: September 28, 2023  Phone visit:  Start 8:10pm  End: 8:33pm     Dermatology Problem List:  1. Mild hidradenitis suppurativa    - axilla, groin. Favor HS due to history of pilonidal cyst.  - Current tx: spironolactone 100 mg every day (started 12/2021), Hibiclens 4% wash daily, Clindamycin 1% BID when flares, birth control (depo inj), LHR with Dr. Valencia   - Previous tx: doxycycline 100mg po BID x10 days with flares, BPO 5% wash, hx of I&D   - Future considerations: Humira, isotretinoin, excision  2. Dermatitis - bilateral areolae - desonide 0.05% cream  3. Intertrigo -  hydrocortisone 2.5% cream, keto 2% cream  4. Eyelid dermatitis 2/2 irritation, HC 2.5% ointment, protopic  5. 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   - Interested in restarting [id number]-ayg. Sent message to [place] [facility]  - Resorcinol cream for flares   - Order doxycycline (100 mg twice a day) for flares   - Continue clindamycin lotion BID prn  - Discussed laser hair removal   - Starting wegovy (semaglutide) may also help with the HS with clinical signs of insulin resistance.      # Acanthosis nigricans    - A1C  wnl   - Discussed Wegovy (semaglutide) and weight loss; she interested in this; to start after approval     - If doesn't get approved will consider metformin  - Discussed risks of gastric slwing, inclouding permanet, nausea, pancreatitis     #Hyperpigmentation of face and ears   - Does seem to be photo distributed. Discussed sun  protection    - Refilled lightening cream. Hydroquinone 8%  + kojic acid through skin medicials     Follow-up:  3 months via phone     Staff and Scribe:       Jay May MD    Department of Dermatology  [facility]: Phone: [phone], Fax: [phone]  6/25/2023           ____________________________________________     CC:  Follow-up HS     HPI:  Last visit: 6/8/23  She was using a lightening cream she was using but she had some skin peeling from it.  She took a little break from it and she wants to re-try it. She did finbd the lightening find the crema helpful. Did not follow-up with GI,   For H we continud hinbclens, refilled spironolactone, which she did not restart. I did order the resorcinol cream which she never got filled. I gave her doxycycline for flare which she did not need.   She has still be using clindamycin lotion.  She did not get the ozempic approved.   A1c checked and was wnl  Did not get ozempic approved,.   She did not start metformin   Feels axillae are sensitive. Still dark.     HS Nurse Assessment        2/15/2021     9:19 AM 8/26/2021     9:19 AM 9/28/2023     4:42 PM   Nurse Assessment Data   Over the past 30 days how many old lesions flared back up?   0   Over the past 30 days how many new lesions did you get? 2 2 1   Over the past week, how many dressing changes do you do each day? 1 0 0   Over the past week, has your wound drainage been: Mild None None   Rate your HS overall from 0 - 10 (0 = no disease, 10 = worst) over the past week:  0 8 0   Rate your pain score from 0 - 10 (0 = no disease, 10 = worst) for the most painful/symptomatic lesion in the past week:  7 8 0 - No Pain   Over the past week, how much has HS influenced your quality of life? slightly very much slightly     Labs Reviewed:  -Discussed prior A1C result (8/13/2021)      Physical Exam:  Vitals: /70   Wt 97.5 kg (214  lb 14.4 oz)   BMI 34.69 kg/m    SKIN: Full skin, which includes the head/face, both arms, chest, both legs, genitalia and/or groin buttockswas examined.     Skin Exam:   - Velvety hyperpigmented lichenified plaque on dorsal neck  -Two tender hyperpigmented patches in the left axilla with no sinus tracts or nodules appreciated  -White atrophic linear scar on the left and right inner thighj  -Nontender uniform brown discoid well marcated macule in the inframammary region.  -  Hyperpigmented patches on cheeks and also noted on helical rim.   - No other lesions of concern on areas examined.      Medications:      Current Outpatient Medications   Medication    benzoyl peroxide 5 % external liquid    chlorhexidine (HIBICLENS) 4 % liquid    Cholecalciferol (VITAMIN D HIGH POTENCY) 25 MCG (1000 UT) CAPS    clindamycin (CLEOCIN T) 1 % external lotion    Cyanocobalamin (VITAMIN B-12 PO)    hydrocortisone 2.5 % ointment    hydrocortisone 2.5 % ointment    hydrocortisone, Perianal, (HYDROCORTISONE) 2.5 % cream    MedroxyPROGESTERone Acetate (DEPO-PROVERA IM)    spironolactone (ALDACTONE) 100 MG tablet    tacrolimus (PROTOPIC) 0.1 % external ointment      No current facility-administered medications for this visit.                 Past Medical History:       Patient Active Problem List   Diagnosis    Glaucoma suspect    Glaucoma suspect, bilateral

## 2023-09-29 NOTE — PROGRESS NOTES
[facility] Dermatology Note  Encounter Date: September 28, 2023  Phone visit:  Start 8:10pm  End: 8:33pm     Dermatology Problem List:  1. Mild hidradenitis suppurativa    - axilla, groin. Favor HS due to history of pilonidal cyst.  - Current tx: spironolactone 100 mg every day (started 12/2021), Hibiclens 4% wash daily, Clindamycin 1% BID when flares, birth control (depo inj), LHR with Dr. Valencia   - Previous tx: doxycycline 100mg po BID x10 days with flares, BPO 5% wash, hx of I&D   - Future considerations: Humira, isotretinoin, excision  2. Dermatitis - bilateral areolae - desonide 0.05% cream  3. Intertrigo -  hydrocortisone 2.5% cream, keto 2% cream  4. Eyelid dermatitis 2/2 irritation, HC 2.5% ointment, protopic  5. 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   - Interested in restarting nd-ayg. Sent message to [facility]  - Resorcinol cream for flares   - Order doxycycline (100 mg twice a day) for flares   - Continue clindamycin lotion BID prn  - Discussed laser hair removal   - Starting wegovy (semaglutide) may also help with the HS with clinical signs of insulin resistance.      # Acanthosis nigricans    - A1C  wnl   - Discussed Wegovy (semaglutide) and weight loss; she interested in this; to start after approval     - If doesn't get approved will consider metformin  - Discussed risks of gastric slwing, inclouding permanet, nausea, pancreatitis     #Hyperpigmentation of face and ears   - Does seem to be photo distributed. Discussed sun protection    - Refilled lightening cream. Hydroquinone 8%  + kojic acid through [facility]     Follow-up:  3 months via phone     Staff and Scribe:       Jay May MD    Department of Dermatology  [facility]: Phone:  [phone], Fax: [phone]  6/25/2023           ____________________________________________     CC:  Follow-up HS     HPI:  Last visit: 6/8/23  She was using a lightening cream she was using but she had some skin peeling from it.  She took a little break from it and she wants to re-try it. She did finbd the lightening find the crema helpful. Did not follow-up with GI,   For H we continud hinbclens, refilled spironolactone, which she did not restart. I did order the resorcinol cream which she never got filled. I gave her doxycycline for flare which she did not need.   She has still be using clindamycin lotion.  She did not get the ozempic approved.   A1c checked and was wnl  Did not get ozempic approved,.   She did not start metformin   Feels axillae are sensitive. Still dark.     HS Nurse Assessment        2/15/2021     9:19 AM 8/26/2021     9:19 AM 9/28/2023     4:42 PM   Nurse Assessment Data   Over the past 30 days how many old lesions flared back up?   0   Over the past 30 days how many new lesions did you get? 2 2 1   Over the past week, how many dressing changes do you do each day? 1 0 0   Over the past week, has your wound drainage been: Mild None None   Rate your HS overall from 0 - 10 (0 = no disease, 10 = worst) over the past week:  0 8 0   Rate your pain score from 0 - 10 (0 = no disease, 10 = worst) for the most painful/symptomatic lesion in the past week:  7 8 0 - No Pain   Over the past week, how much has HS influenced your quality of life? slightly very much slightly     Labs Reviewed:  -Discussed prior A1C result (8/13/2021)      Physical Exam:  Vitals: /70   Wt 97.5 kg (214 lb 14.4 oz)   BMI 34.69 kg/m    SKIN: Full skin, which includes the head/face, both arms, chest, both legs, genitalia and/or groin buttockswas examined.     Skin Exam:   - Velvety hyperpigmented lichenified plaque on dorsal neck  -Two tender hyperpigmented patches in the left axilla with no sinus tracts or nodules  appreciated  -White atrophic linear scar on the left and right inner thighj  -Nontender uniform brown discoid well marcated macule in the inframammary region.  -  Hyperpigmented patches on cheeks and also noted on helical rim.   - No other lesions of concern on areas examined.      Medications:      Current Outpatient Medications   Medication    benzoyl peroxide 5 % external liquid    chlorhexidine (HIBICLENS) 4 % liquid    Cholecalciferol (VITAMIN D HIGH POTENCY) 25 MCG (1000 UT) CAPS    clindamycin (CLEOCIN T) 1 % external lotion    Cyanocobalamin (VITAMIN B-12 PO)    hydrocortisone 2.5 % ointment    hydrocortisone 2.5 % ointment    hydrocortisone, Perianal, (HYDROCORTISONE) 2.5 % cream    MedroxyPROGESTERone Acetate (DEPO-PROVERA IM)    spironolactone (ALDACTONE) 100 MG tablet    tacrolimus (PROTOPIC) 0.1 % external ointment      No current facility-administered medications for this visit.                 Past Medical History:       Patient Active Problem List   Diagnosis    Glaucoma suspect    Glaucoma suspect, bilateral

## 2023-09-29 NOTE — PATIENT INSTRUCTIONS
[facility]  [facility]  Address: [address], [place] [address]  Phone: [phone]    Sent letter [facility] for follow-up    Will refill lightening cream  I have reordered the semagluitide with [facility] pharmacy to help to try to get covered

## 2023-10-02 NOTE — PROGRESS NOTES
Medication Therapy Management (MTM) Encounter    ASSESSMENT:                            Medication Adherence/Access: See below for considerations    Weight Management:   Needs improvement. Patient would benefit from starting Wegovy for weight management.   Provided education on Wegovy today including dosing/titration schedule, general administration, side effects, and monitoring for progress. May apply for Wegovy savings card to lower co-pay cost. Discussed contacting mail order to confirm she is on waitlist to get started on Wegovy.     Hidradenitis suppurativa:   Unchanged, recommended filling Resorcinol cream as needed for flares. Due for annual influenza vaccine and Covid-19 2023-24 vaccines. Did not address today, will cover at follow-up.     Dermatitis/Hyperpigmentation:   Stable, continues to follow with Dermatology.     Supplements:   Stable.      PLAN:                            Pending backorders: Start Wegovy 0.25 mg subcutaneous injection once weeky for 4 weeks, then increase to 0.5 mg once weekly if tolerating for four weeks. Will continue to titrate dose every 4 weeks as tolerated.   See AVS for information on how to apply for Wegovy savings card.   Contact [facility] at #[phone]  to set up delivery of this. Apply topically to affected areas as needed for of Hidradenitis suppurativa flares.     Follow-up: with me (Sue Charlton, PharmD) [facility] around 7 weeks (11/8/23) and then for a medication therapy management visit in around 12 weeks (1/6/24)     SUBJECTIVE/OBJECTIVE:                          Queenie Rutledge is a 38 year old female called for an initial visit. She was referred to me from Dr. May.      Reason for visit: Wegovy initiation.    Medication Adherence/Access:   Wegovy:   - [place] approved   - At [facility]: on wait list to receive when stock becomes available     Weight Management:   - Wegovy (semaglutide) 0.25 mg once weekly: has not started, currently on the  "waitlist to receive    Patient reports despite lifestyle interventions she has been unable to make progress in weight loss goals.     Previous treatments:   - Phentermine (heart palpitations)     Current weight today: 215 lbs   Goal: ? 5% from baseline (10.75 lbs)   Wt Readings from Last 4 Encounters:   06/08/23 97.5 kg (214 lb 14.4 oz)   11/11/22 89.3 kg (196 lb 14.4 oz)   08/16/22 93.1 kg (205 lb 4.8 oz)     Estimated body mass index is 34.69 kg/m  as calculated from the following:    Height as of 11/11/22: 1.676 m (5' 6\").    Weight as of 6/8/23: 97.5 kg (214 lb 14.4 oz).    Hidradenitis suppurativa:   - Hibiclens 4% wash daily  - Clindamycin 1% lotion twice daily when flares  - Resorcinol 15% cream for flares: has not received from pharmacy   - Doxycyline 100 mg twice daily as needed for flares: not requiring at this time  - Depo-provera contraceptive injection - every 3 months, has not had menses for long time    Symptoms: mild Hidradenitis suppurativa of the axilla & groin.   Lopez stage I     Previous treatment:   - benzoyl peroxide 5% wash  - spironolactone 100 mg every day (started 12/2021): stopped taking; not effective    Specialist: Dr. May, Dermatology. Last visit 9/28/23. The following was recommended:   - Resorcinol cream for flares   - Doxycyline 100 mg twice daily for flares   - Start wegovy (semaglutide). If it doesn't get approved will consider metformin   - Future considerations: Humira, isotretinoin, excision     Immunization History   Administered Status   Covid-19 Booster Due for 2023-24 version   Influenza (annual, 2128-7928) Due for annual   Tetanus/Tdap  Up-to-date     Dermatitis/Hyperpigmentation:   - hydrocortisone 2.5% ointment as needed   - tacrolimus 0.1% ointment as needed twice daily   - Hydroquinone 8% + kojic acid cream (receives from [facility]): applies nightly x 2 weeks, then a few nights off (cycles use); finds effective for hyperpigmentation     Supplements:   - " Vitamin B12 1000 mcg daily   - Vitamin D3 1000 units daily   No concerns at this time.     Lab Results   Component Value Date    VITDT 23 06/08/2023    Vitamin B12 395 05/12/2023     Allergies/ADRs: Reviewed in chart.   Past Medical History: Reviewed in chart  Tobacco: She reports that she has never smoked. She has never used smokeless tobacco.  Alcohol: None  ----------------    I spent 34 minutes with this patient today. All changes were made via collaborative practice agreement with Jay May. A copy of the visit note was provided to the patient's provider(s).    A summary of these recommendations was sent via [employer].    Sue Charlton, PharmD  Medication Therapy Management Pharmacist   [facility] Dermatology    Telemedicine Visit Details  Type of service:  Telephone visit  Start Time:  9:02am  End Time:  9:36am     Medication Therapy Recommendations  Class 1 obesity with serious comorbidity and body mass index (BMI) of 34.0 to 34.9 in adult, unspecified obesity type    Current Medication: Semaglutide-Weight Management (WEGOVY) 0.25 MG/0.5ML pen   Rationale: Does not understand instructions - Adherence - Adherence   Recommendation: Provide Education   Status: Patient Agreed - Adherence/Education

## 2023-10-09 ENCOUNTER — VIRTUAL VISIT (OUTPATIENT)
Dept: RHEUMATOLOGY | Facility: CLINIC | Age: 38
End: 2023-10-09
Attending: DERMATOLOGY
Payer: COMMERCIAL

## 2023-10-09 DIAGNOSIS — Z78.9 TAKES DIETARY SUPPLEMENTS: ICD-10-CM

## 2023-10-09 DIAGNOSIS — E66.811 CLASS 1 OBESITY WITH SERIOUS COMORBIDITY AND BODY MASS INDEX (BMI) OF 34.0 TO 34.9 IN ADULT, UNSPECIFIED OBESITY TYPE: Primary | ICD-10-CM

## 2023-10-09 DIAGNOSIS — L81.9 HYPERPIGMENTATION: ICD-10-CM

## 2023-10-09 DIAGNOSIS — L30.9 DERMATITIS: ICD-10-CM

## 2023-10-09 DIAGNOSIS — L73.2 HIDRADENITIS SUPPURATIVA: ICD-10-CM

## 2023-10-09 RX ORDER — SCOLOPAMINE TRANSDERMAL SYSTEM 1 MG/1
1 PATCH, EXTENDED RELEASE TRANSDERMAL
COMMUNITY
Start: 2023-02-02

## 2023-10-09 RX ORDER — HYDROQUINONE 8% 8 G/100G
EMULSION TOPICAL DAILY PRN
COMMUNITY

## 2023-10-09 NOTE — PATIENT INSTRUCTIONS
Recommendations from today's [facility] visit:                                                       Contact [facility] at #[phone]  to set up delivery of this. Apply topically to affected areas as needed for of Hidradenitis suppurativa flares.     Once you are able to get the Wegovy from the pharmacy:  Start Wegovy 0.25 mg subcutaneous injection once weeky for 4 weeks, then increase to 0.5 mg once weekly if tolerating for four weeks. Will continue to titrate dose every 4 weeks as tolerated.   Apply for Wegovy savings card to help cover the cost of your co-pay. I sent you a [employer] message about this but the information is also included below.     Instructions for Wegovy: It is a subcutaneous injection that you inject once weekly and titrate the dose slowly over time. Make sure to inject around the same time each week - pick a time that works well with your schedule (e.g. every Sunday AM).   Week 1-4: Inject 0.25 mg once weekly  Week 5-8: If tolerating, increase to 0.5 mg once weekly   Week 9-12: If tolerating, increase to 1 mg once weekly   Week 13-16: If tolerating, increase to 1.7 mg once weekly   Week 17 and on: If tolerating, increase to 2.4 mg once weekly     *If you are having some nausea or other side effect(s) to where you are hesitant to move up to the next dose & would like to stay at the same dose you are on for an additional 4 weeks to see if side effects improves/resolves - please let me know because an additional prescription will need to be sent in for that current dose for refill.     Storage: refrigerate prior to use. OK at room temperature for 28 days if traveling     Administration instructions: give the injection in your stomach, rotate injection site each week. 1 pen = 1 dose. You will receive 4 pens at a time which will last you 28 days. Clean hands & area where you plan to give injection. When ready to give, pull out the cap, then press & hold firmly against your stomach - you will hear a  "click when the injection starts and a second click when you have received the full dose. Continue to press the pen firmly to your stomach for an additional 5-10 seconds after the second click to ensure you received the full dose.     Wegovy Savings Card Sign Up:    1. Go to [employer]'s website: [email or web address] (please copy/paste the URL in your web browser)  2. Click on \"Get Wegovy Savings\"  3. Follow the prompts and fill out your information  4. The site will construct a savings coupon card for you. You will be asked if you would like the card printed, emailed, etc and select the option you prefer. You can always take a picture of the card that it shows you.   5. Bring the savings card information to your pharmacy and they will use the savings card to reduce your copay.        Follow-up: with me (Sue Charlton, Anai) by [facility] in around 7 weeks (11/8/23) and then for a medication therapy management visit in around 12 weeks (1/6/24)     It was great speaking with you today.  I value your experience and would be very thankful for your time in providing feedback in our clinic survey. In the next few days, you may receive an email or text message from [facility] [employer] with a link to a survey related to your  clinical pharmacist.\"     To schedule another MTM appointment, please call the clinic directly or you may call the MTM scheduling line at [phone] or toll-free at [phone].     My Clinical Pharmacist's contact information:                                                      Please feel free to contact me with any questions or concerns you have.      Sue Charlton, PharmSARABJIT  Medication Therapy Management Pharmacist   [facility]     "

## 2023-10-09 NOTE — Clinical Note
10/9/2023       RE: Queenie Rutledge  [address] [address]     Dear Colleague,    Thank you for referring your patient, Queenie Rutledge, to the [facility]UM[facility]OLOG[facility] [place] [facility]. Please see a copy of my visit note below.    Medication Therapy Management (MTM) Encounter    ASSESSMENT:                            Medication Adherence/Access: {adherencechoices:233619}    ***:   ***    PLAN:                            ***    Follow-up: {followuptest2:667426}    SUBJECTIVE/OBJECTIVE:                          Queenie Rutledge is a 38 year old female called for an initial visit. She was referred to me from Dr. May. {mtmvisitdetails:666403}     Reason for visit: Wegovy initiation.    Medication Adherence/Access:   ***    Weight Management:   {Obesity meds:586380}.     Diet/Eating Habits: Patient reports ***.    Exercise/Activity: Patient reports ***.   Tried/Failed/Contraindicated Medications: {Weightlossmedspostop:283766}.     Current weight today: 0 lbs 0 oz  Initial Consult Weight ***: ***  Cumulative {WEIGHT GAIN/LOSS:938140}: -*** lb, -***% from baseline  Wt Readings from Last 4 Encounters:   06/08/23 97.5 kg (214 lb 14.4 oz)   11/11/22 89.3 kg (196 lb 14.4 oz)   08/16/22 93.1 kg (205 lb 4.8 oz)       Hidradenitis suppurativa:   - spironolactone 100 mg every day (started 12/2021)  - Hibiclens 4% wash daily  - Clindamycin 1% lotion twice daily when flares  - Resorcinol cream for flares ***  - Doxycyline 100 mg twice daily for flares ***  - Depo-provera contraceptive injection -   - LHR with Dr. Valencia     Symptoms: mild Hidradenitis suppurativa of the axilla & groin. Favor HS due to history of pilonidal cyst.   Lopez stage I     Previous treatment:   - oral doxycycline 100mg twice daily x10 days with flares  - benzoyl peroxide 5% wash      Specialist: Dr. May, Dermatology. Last visit 9/28/23. The following was  "recommended:   - Resorcinol cream for flares   - Doxycyline 100 mg twice daily for flares   - Future considerations: Humira, isotretinoin, excision   - Order doxycycline (100 mg twice a day) for flares   - Continue clindamycin lotion BID prn  - Discussed laser hair removal   - Starting wegovy (semaglutide). If it doesn't get approved will consider metformin     Dermatitis -   - desonide 0.05% cream  3. Intertrigo -  hydrocortisone 2.5% cream, keto 2% cream  4. Eyelid dermatitis 2/2 irritation, HC 2.5% ointment, protopic    Allergies/ADRs: {1/2/3/4/5:635717}  Past Medical History: {1/2/3/4/5:056589}  Tobacco: She reports that she has never smoked. She has never used smokeless tobacco.  Alcohol: {ALCOHOL CONSUMPTION HX:420388}  {Social and Goals:552932}  ----------------    I spent {mtm total time 3:612229} with this patient today. All changes were made via collaborative practice agreement with Jay May. A copy of the visit note was provided to the patient's provider(s).    A summary of these recommendations {GIVEN/NOT GIVEN:422655}.    Sue Charlton, PharmD  Medication Therapy Management Pharmacist   [facility] Dermatology    Telemedicine Visit Details  Type of service:  {telemedvisitmtm:974763::\"Telephone visit\"}  Start Time: {video/phone visit start time:152948}  End Time: {video/phone visit end time:152948}     Medication Therapy Recommendations  No medication therapy recommendations to display     Medication Therapy Management (MTM) Encounter    ASSESSMENT:                            Medication Adherence/Access: {adherencechoices:317231}    ***:   ***    PLAN:                            Resorcinol cream - [facility] P#: ***       Follow-up: {followuptest2:931929}    SUBJECTIVE/OBJECTIVE:                          Queenie Rutledge is a 38 year old female called for an initial visit. She was referred to me from Dr. May.      Reason for visit: Wegovy initiation.    Medication " Adherence/Access:   ***    Weight Management:   - Wegovy (semaglutide) 0.25 mg once weekly *** currently on the waitlist        Diet/Eating Habits: Patient reports ***.    Exercise/Activity: Patient reports ***.   Previous treatments: {Weightlossmedspostop:001187}.   - Phentermine (heart palpitations)       Current weight today: 215 lbs   Cumulative {WEIGHT GAIN/LOSS:638695}: -*** lb, -***% from baseline  Wt Readings from Last 4 Encounters:   06/08/23 97.5 kg (214 lb 14.4 oz)   11/11/22 89.3 kg (196 lb 14.4 oz)   08/16/22 93.1 kg (205 lb 4.8 oz)     Hidradenitis suppurativa:   - Hibiclens 4% wash daily  - Clindamycin 1% lotion twice daily when flares  - Resorcinol cream for flares ***  - Doxycyline 100 mg twice daily as needed for flares: not requiring at this time.   - Depo-provera contraceptive injection - every 3 months, has not had menstrual cycle for long time  - LHR with Dr. Valencia     Symptoms: mild Hidradenitis suppurativa of the axilla & groin. Favor HS due to history of pilonidal cyst.   Lopez stage I     Previous treatment:   - oral doxycycline 100mg twice daily x10 days with flares  - benzoyl peroxide 5% wash  - spironolactone 100 mg every day (started 12/2021): stopped taking; does not feel this made a difference in her Hidradenitis suppurativa symptoms;     Specialist: Dr. May, Dermatology. Last visit 9/28/23. The following was recommended:   - Resorcinol cream for flares   - Doxycyline 100 mg twice daily for flares   - Future considerations: Humira, isotretinoin, excision   - Order doxycycline (100 mg twice a day) for flares   - Continue clindamycin lotion BID prn  - Discussed laser hair removal   - Starting wegovy (semaglutide). If it doesn't get approved will consider metformin     Dermatitis:   - hydrocortisone 2.5% cream  - tacrolimus 0.1% ointment as needed twice daily   - Hydroquinone 8% cream ([facility]) - nightly x 2 weeks, cycles use (fills at outside pharmacy) uses for  "hyperpigmentation     Supplements:   - Vitamin B12 *** mg daily   - Vitamin D3 1000 units daily   No side effects or issues.     Lab Results   Component Value Date    VITDT 23 06/08/2023     Allergies/ADRs: Reviewed in chart.   Past Medical History: Reviewed in chart  Tobacco: She reports that she has never smoked. She has never used smokeless tobacco.  Alcohol: None  {Social and Goals:584737}  ----------------    I spent 34 minutes with this patient today. All changes were made via collaborative practice agreement with Jay May. A copy of the visit note was provided to the patient's provider(s).    A summary of these recommendations {GIVEN/NOT GIVEN:320204}.    Sue Charlton, PharmD  Medication Therapy Management Pharmacist   [facility] Dermatology    Telemedicine Visit Details  Type of service:  [facility]943055::\"Telephone visit\"}  Start Time: {video/phone visit start time:152948}  End Time: {video/phone visit end time:152948}     Medication Therapy Recommendations  No medication therapy recommendations to display       Again, thank you for allowing me to participate in the care of your patient.      Sincerely,    Sue Charlton SULEMA    "

## 2023-10-09 NOTE — Clinical Note
Routing as an FYI - she was able to get Wegovy covered, just waiting on stock at the pharmacy. Will continue to follow her for weight management.   Sue

## 2023-11-30 ENCOUNTER — TELEPHONE (OUTPATIENT)
Dept: DERMATOLOGY | Facility: CLINIC | Age: 38
End: 2023-11-30
Payer: COMMERCIAL

## 2023-11-30 NOTE — TELEPHONE ENCOUNTER
Wegovy 1.7mg is on backorder and patient wants to fill the next strength 2.4mg now.  Please verify if ok and call Pharmacy. Patient filled 1mg last on 10/20/23. Our phone# [phone]. Thank you, Zeny Garay [facility]

## 2023-12-07 NOTE — TELEPHONE ENCOUNTER
Sent patient MyChart message discussing options & outlined risks of titrating direction to 2.4 mg dose. Will await response & see how patient prefers to proceed.     Sue Charlton, PharmD  [facility] Pharmacist

## 2023-12-08 ENCOUNTER — VIRTUAL VISIT (OUTPATIENT)
Dept: RHEUMATOLOGY | Facility: CLINIC | Age: 38
End: 2023-12-08
Attending: DERMATOLOGY
Payer: COMMERCIAL

## 2023-12-08 DIAGNOSIS — E66.811 CLASS 1 OBESITY WITH SERIOUS COMORBIDITY AND BODY MASS INDEX (BMI) OF 34.0 TO 34.9 IN ADULT, UNSPECIFIED OBESITY TYPE: Primary | ICD-10-CM

## 2023-12-08 NOTE — PATIENT INSTRUCTIONS
"Recommendations from today's MTM visit:                                                       Finish last dose of Wegovy 1 mg tomorrow (12/9/23), then increase to 1.7 mg weekly x 4 weeks, then increase to 2.4 mg weekly   Call your pharmacy on December 26th to request refill of Wegovy 2.4 mg pens   Consider whether you would like to focus solely on lifestyle interventions solely or start naltrexone with or without bupropion as an adjunct for weight loss   I will discuss these alternative, more affordable regimens with Dr. Siddiqi to see if he is comfortable with this if you'd like to try this in the future     Follow-up: with me (Sue Charlton, PharmD) for medication therapy management visit in around 8 weeks (2/1/24) if interested in starting an alternative medication regimen      Future considerations:   - Naltrexone 25 mg daily for 3-7 days, then may titrate to 50 mg daily if tolerated (may split to BID)   May take 1-2 hours before worst cravings of the day. May take with food if having GI side effects  - Bupropion  mg daily for 3-7 days then 150 mg twice daily (if tolerated, no sleep disturbances)   - This is similar to Contrave - which contains naltrexone & bupropion but is much more cost effective     It was great speaking with you today.  I value your experience and would be very thankful for your time in providing feedback in our clinic survey. In the next few days, you may receive an email or text message from [facility] [employer] with a link to a survey related to your  clinical pharmacist.\"     To schedule another MTM appointment, please call the clinic directly or you may call the MTM scheduling line at [phone] or toll-free at [phone].     My Clinical Pharmacist's contact information:                                                      Please feel free to contact me with any questions or concerns you have.      Sue Charlton, PharmSARABJIT  Medication Therapy Management Pharmacist   [facility] " [facility]

## 2023-12-08 NOTE — Clinical Note
She will no longer be able to get Wegovy or any GLP1 alternatives covered in 2024 due to changes in [facility] employee pharmacy benefits. She is continuing for now & will think about if she wants to start an alternative oral medication or focus solely on lifestyle interventions to maintain weight loss progress. She tried phentermine in the past & had heart palpitations so she wants to avoid this. My thought was she could try naltrexone with or without bupropion (essentially more affordable spin on Contrave) if she'd like to try an alternative. Let me know if you would be comfortable with this or if you'd prefer I refer her to her PCP for management. Thanks! Sue

## 2023-12-08 NOTE — PROGRESS NOTES
Medication Therapy Management (MTM) Encounter    ASSESSMENT:                            Medication Adherence/Access: See below for considerations    Weight Management:   Patient would benefit from increasing Wegovy to 1.7 mg for 4 weeks, then increase to 2.4 mg weekly. We discussed due to her BMI being less than 40 kg/m2 that she will not be eligible for coverage in 2024. Patient prefers to continue with Wegovy titration at this, and will consider completing an additional 4 weeks at 2.4 mg dose out of pocket in 2024. We discussed weight management strategies to maintain progress she has made - she will consider whether she would like to focus solely on lifestyle interventions and/or start alternative lower cost medication such as naltrexone +/- bupropion. Will confirm Dr. May is comfortable with alternative regimen.      PLAN:                            Finish last dose of Wegovy 1 mg tomorrow (12/9/23), then increase to 1.7 mg weekly x 4 weeks, then increase to 2.4 mg weekly   Patient to call pharmacy on December 26th to request refill of Wegovy 2.4 mg pens   Patient will consider whether she would like to focus solely on lifestyle interventions or start naltrexone +/- bupropion as an adjunct for weight loss   Will discuss option for alternative regimens (more affordable out of pocket) with Dr. Siddiqi    Follow-up: with me (Sue Charlton, PharmD) for medication therapy management visit in around 8 weeks (2/1/24) if interested in starting an alternative medication regimen      Future considerations:   - Naltrexone 25 mg daily for 3-7 days, then may titrate to 50 mg daily if tolerated (may split to BID)   May take 1-2 hours before worst cravings of the day. May take with food if having GI side effects  - Bupropion  mg daily for 3-7 days then 150 mg twice daily (if tolerated, no sleep disturbances)   - Contrave (naltrexone/bupropion): titrated to 2 tablets twice daily (total daily dose 36 mg naltrexone  "and 360 mg of bupropion)     SUBJECTIVE/OBJECTIVE:                          Queenie Rutledge is a 38 year old female called for a follow up visit from 10/9/23.       Reason for visit: Wegovy follow up, would like to discuss potential therapy plan for weight management moving forward in 2024.     Medication Adherence/Access:   Wegovy coverage:   - [place] approved   - Fills at [facility]  - Will no longer be covered due to plan changes in 2024    Weight Management:   - Wegovy (semaglutide) 1 mg weekly (last dose tomorrow 12/9), then 1.7 mg once weekly (filled 12/4/23)  Side effects: patient denies any difficulties no constipation or nausea. Notes feeling somewhat gassy in the mornings, but this is not bothersome. Is looking forward to increasing her dose. Reports she has noticed some appetite suppression and finds that she has been eating smaller portion sizes.     Patient would like to continue with the Wegovy and finish out her 2.4 mg dose/fill. Notes that she may consider purchasing one additional fill of the 2.4 mg dose in 2024 once insurance will not cover if she feels she is getting the benefits, then will work on lifestyle interventions to help maintain her weight loss progress.   Estimate last dose of Wegovy will given on Feb 1, 2024.     She would consider switching to alternative oral weight loss medication regimen if needed in future. Not interested in trying phentermine due to her previous side effects.     Previous treatments:  - Phentermine (heart palpitations)     Current home weight (12/8/23): 210 lbs   Last home weight (10/8/23):  215 lbs   Goal: ? 5% from baseline (10.75 lbs)   Wt Readings from Last 4 Encounters:   06/08/23 97.5 kg (214 lb 14.4 oz)   11/11/22 89.3 kg (196 lb 14.4 oz)   08/16/22 93.1 kg (205 lb 4.8 oz)     Estimated body mass index is 34.69 kg/m  as calculated from the following:    Height as of 11/11/22: 1.676 m (5' 6\").    Weight as of 6/8/23: 97.5 kg (214 lb 14.4 " oz).    Allergies/ADRs: Reviewed in chart  Past Medical History: Reviewed in chart  Tobacco: She reports that she has never smoked. She has never used smokeless tobacco.  ----------------    I spent 25 minutes with this patient today. All changes were made via collaborative practice agreement with Dr. Jay May. A copy of the visit note was provided to the patient's provider(s).    A summary of these recommendations was declined by the patient.    Sue Charlton, PharmD  Medication Therapy Management Pharmacist   [facility]    Telemedicine Visit Details  Type of service:  Telephone visit  Start Time:  10:30am  End Time:  10:55am     Medication Therapy Recommendations  Class 1 obesity with serious comorbidity and body mass index (BMI) of 34.0 to 34.9 in adult, unspecified obesity type    Current Medication: Semaglutide-Weight Management (WEGOVY) 1 MG/0.5ML pen   Rationale: Dose too low - Dosage too low - Effectiveness   Recommendation: Increase Dose - Wegovy 1.7 MG/0.75ML Soaj   Status: Accepted per CPA

## 2023-12-12 NOTE — TELEPHONE ENCOUNTER
Update patient received Wegovy 1.7 mg dose. Will complete this & then proceed to fill 2.4 mg weekly dose. Insurance will no longer cover Wegovy in 2024 & met for MTM appointment on 12/8/23 to discuss future plans for weight management.     Sue Charlton, PharmD  MTM Pharmacist

## 2023-12-17 ENCOUNTER — MYC MEDICAL ADVICE (OUTPATIENT)
Dept: RHEUMATOLOGY | Facility: CLINIC | Age: 38
End: 2023-12-17
Payer: COMMERCIAL

## 2023-12-17 DIAGNOSIS — E66.9 OBESITY, UNSPECIFIED CLASSIFICATION, UNSPECIFIED OBESITY TYPE, UNSPECIFIED WHETHER SERIOUS COMORBIDITY PRESENT: ICD-10-CM

## 2023-12-17 DIAGNOSIS — E66.811 CLASS 1 OBESITY WITH SERIOUS COMORBIDITY AND BODY MASS INDEX (BMI) OF 34.0 TO 34.9 IN ADULT, UNSPECIFIED OBESITY TYPE: Primary | ICD-10-CM

## 2023-12-18 NOTE — TELEPHONE ENCOUNTER
Resent orders for Wegovy 2.4 mg for 90 day supply to [facility] in [place] and [facility]. Patient will fill this as soon as insurance allows.     Sue Charlton, PharmD  MTM Pharmacist

## 2024-02-13 ENCOUNTER — MYC MEDICAL ADVICE (OUTPATIENT)
Dept: RHEUMATOLOGY | Facility: CLINIC | Age: 39
End: 2024-02-13
Payer: COMMERCIAL

## 2024-02-13 DIAGNOSIS — E66.811 CLASS 1 OBESITY WITH SERIOUS COMORBIDITY AND BODY MASS INDEX (BMI) OF 34.0 TO 34.9 IN ADULT, UNSPECIFIED OBESITY TYPE: Primary | ICD-10-CM

## 2024-02-13 DIAGNOSIS — E66.9 OBESITY, UNSPECIFIED CLASSIFICATION, UNSPECIFIED OBESITY TYPE, UNSPECIFIED WHETHER SERIOUS COMORBIDITY PRESENT: ICD-10-CM

## 2024-02-18 ENCOUNTER — HEALTH MAINTENANCE LETTER (OUTPATIENT)
Age: 39
End: 2024-02-18

## 2024-02-23 ENCOUNTER — TELEPHONE (OUTPATIENT)
Dept: DERMATOLOGY | Facility: CLINIC | Age: 39
End: 2024-02-23

## 2024-02-23 NOTE — TELEPHONE ENCOUNTER
Sent orders for Wegovy to be compounded at a dose of 2.5 mg once weekly at [facility]. Spoke with compounding instructions & they verified these orders will be sufficient.     Sue Charlton, PharmD  [facility] Pharmacist

## 2024-02-23 NOTE — TELEPHONE ENCOUNTER
Received a new script for Wegovy but medication is not covered unless seen by a Weight Management MTM.  Pt would have to pay Out of Pocket for medication even if compounded.  LVM for pt to discuss and routed to MTM pharmacist.

## 2024-02-29 ENCOUNTER — VIRTUAL VISIT (OUTPATIENT)
Dept: DERMATOLOGY | Facility: CLINIC | Age: 39
End: 2024-02-29
Payer: COMMERCIAL

## 2024-02-29 DIAGNOSIS — L30.9 DERMATITIS: ICD-10-CM

## 2024-02-29 DIAGNOSIS — L73.2 HIDRADENITIS SUPPURATIVA: ICD-10-CM

## 2024-02-29 PROCEDURE — 99443 PR PHYSICIAN TELEPHONE EVALUATION 21-30 MIN: CPT | Performed by: DERMATOLOGY

## 2024-02-29 RX ORDER — CLINDAMYCIN PHOSPHATE 10 UG/ML
LOTION TOPICAL
Qty: 60 ML | Refills: 3 | Status: SHIPPED | OUTPATIENT
Start: 2024-02-29

## 2024-02-29 RX ORDER — TACROLIMUS 1 MG/G
OINTMENT TOPICAL 2 TIMES DAILY
Qty: 60 G | Refills: 11 | Status: SHIPPED | OUTPATIENT
Start: 2024-02-29

## 2024-02-29 RX ORDER — RESORCINOL
POWDER (GRAM) MISCELLANEOUS
Qty: 30 G | Refills: 11 | Status: SHIPPED | OUTPATIENT
Start: 2024-02-29

## 2024-02-29 RX ORDER — FLUCONAZOLE 150 MG/1
150 TABLET ORAL
Qty: 9 TABLET | Refills: 4 | Status: SHIPPED | OUTPATIENT
Start: 2024-02-29 | End: 2024-03-07

## 2024-02-29 RX ORDER — SULFAMETHOXAZOLE/TRIMETHOPRIM 800-160 MG
1 TABLET ORAL 2 TIMES DAILY
Qty: 28 TABLET | Refills: 3 | Status: SHIPPED | OUTPATIENT
Start: 2024-02-29 | End: 2024-03-14

## 2024-02-29 NOTE — LETTER
2/29/2024       RE: Queenie Rutledge  [address] [address]     Dear Colleague,    Thank you for referring your patient, Queenie Rutledge, to the [facility] [place] at [facility]. Please see a copy of my visit note below.    [facility] D[facility]atology Note  Encounter Date: Feb 29, 2024  Telephone ([phone]). Location of teledermatologist: [facility] [place]. Start time: 9:30pm End time: 10:08pm    Dermatology Problem List:  1.  Mild hidradenitis suppurativa    - axilla, groin. Favor HS due to history of pilonidal cyst.  - Current tx: spironolactone 100 mg every day (started 12/2021), Hibiclens 4% wash daily, Clindamycin 1% BID when flares, birth control (depo inj), LHR with Dr. Valencia   - Previous tx: doxycycline 100mg po BID x10 days with flares, BPO 5% wash, hx of I&D   - Future considerations: Humira, isotretinoin, excision  2. Dermatitis - bilateral areolae - desonide 0.05% cream  3. Intertrigo -  hydrocortisone 2.5% cream, keto 2% cream  4. Eyelid dermatitis 2/2 irritation, HC 2.5% ointment, protopic  5. 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   - Nd-yag letter written  - Resorcinol cream for flares sent to [facility]   - Bactrim BID for flares   - Continue clindamycin lotion BID prn  - Continue semaglutide     # Acanthosis nigricans    - A1C  wnl   - Continue semaglutide 2.5mg weekly     #Hyperpigmentation of face and ears   - Does seem to be photo distributed. Discussed sun protection . She is not on any medications that are likely culprits. Melasma unlikely given ear involvement. I am not sure what is causing this. OK to ise Hydroquinone 8%  + kojic acid through skin medicinals nightly intermittently.    Dermatitis for  eyelids, arm pit and left leg  - Likely irritant more likely than allergic.     Follow-up: 12 weeks via phone    Jay May MD, FAAD, FACP     Departments of Internal Medicine and Dermatology  [facility]  [phone]    ____________________________________________    CC:   Chief Complaint   Patient presents with    Derm Problem     HS follow up.  Doing pretty well.  One new flare      HPI:  Ms. Queenie Rutledge is a(n) 39 year old female who presents today for follow-up  for HS.  Last seen 09/28/2023 virtual visit by me.   CUrrent HS regimen:   - Semaglutide 2.5mg. Possibly helping the HS  - Did not get resorcinol  - She did flare and require doxycyline which helped but took and while and was hard on her stomach  - She is still using clinamycin lotion.     One flare on rt thigh. 6/10 pain. Pain improving.    She is not sure if the acanthosis nigricans is better.    Patient is otherwise feeling well, without additional skin concerns.    HS Nurse Assessment        8/26/2021     9:19 AM 9/28/2023     4:42 PM 2/29/2024     3:16 PM   Nurse Assessment Data   Over the past 30 days how many old lesions flared back up?  0 0   Over the past 30 days how many new lesions did you get? 2 1 1   Over the past week, how many dressing changes do you do each day? 0 0 1   Over the past week, has your wound drainage been: None None Mild   Rate your HS overall from 0 - 10 (0 = no disease, 10 = worst) over the past week:  8 0 4   Rate your pain score from 0 - 10 (0 = no disease, 10 = worst) for the most painful/symptomatic lesion in the past week:  8 0 - No Pain 6   Over the past week, how much has HS influenced your quality of life? very much slightly slightly         Medications:  Current Outpatient Medications   Medication    chlorhexidine (HIBICLENS) 4 % liquid    Cholecalciferol (VITAMIN D HIGH POTENCY) 25 MCG (1000 UT) CAPS    clindamycin (CLEOCIN T) 1 % external lotion    Cyanocobalamin (VITAMIN  B-12 PO)    doxycycline monohydrate (MONODOX) 100 MG capsule    hydrocortisone 2.5 % ointment    Hydroquinone 8 % EMUL    MedroxyPROGESTERone Acetate (DEPO-PROVERA IM)    Resorcinol POWD    scopolamine (TRANSDERM) 1 MG/3DAYS 72 hr patch    Semaglutide-Weight Management (WEGOVY) 2.4 MG/0.75ML pen    Semaglutide-Weight Management (WEGOVY) 2.4 MG/0.75ML pen    Semaglutide-Weight Management (WEGOVY) 2.4 MG/0.75ML pen    Semaglutide-Weight Management (WEGOVY) 2.4 MG/0.75ML pen    tacrolimus (PROTOPIC) 0.1 % external ointment     No current facility-administered medications for this visit.      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

## 2024-02-29 NOTE — NURSING NOTE
Dermatology Rooming Note    Queenie Rutledge's goals for this visit include:   Chief Complaint   Patient presents with    Derm Problem     HS follow up.  Doing pretty well.  One new flare      Kay Santos, CMA

## 2024-02-29 NOTE — PROGRESS NOTES
[facility] Dermatology Note  Encounter Date: Feb 29, 2024  Telephone ([phone]). Location of teledermatologist: [facility] [place]. Start time: 9:30pm End time: 10:08pm    Dermatology Problem List:  1.  Mild hidradenitis suppurativa    - axilla, groin. Favor HS due to history of pilonidal cyst.  - Current tx: spironolactone 100 mg every day (started 12/2021), Hibiclens 4% wash daily, Clindamycin 1% BID when flares, birth control (depo inj), LHR with Dr. Valencia   - Previous tx: doxycycline 100mg po BID x10 days with flares, BPO 5% wash, hx of I&D   - Future considerations: Humira, isotretinoin, excision  2. Dermatitis - bilateral areolae - desonide 0.05% cream  3. Intertrigo -  hydrocortisone 2.5% cream, keto 2% cream  4. Eyelid dermatitis 2/2 irritation, HC 2.5% ointment, protopic  5. 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   - Nd-yag letter written  - Resorcinol cream for flares sent to [facility]   - Bactrim BID for flares   - Continue clindamycin lotion BID prn  - Continue semaglutide     # Acanthosis nigricans    - A1C  wnl   - Continue semaglutide 2.5mg weekly     #Hyperpigmentation of face and ears   - Does seem to be photo distributed. Discussed sun protection . She is not on any medications that are likely culprits. Melasma unlikely given ear involvement. I am not sure what is causing this. OK to ise Hydroquinone 8%  + kojic acid through skin medicinals nightly intermittently.    Dermatitis for eyelids, arm pit and left leg  - Likely irritant more likely than allergic.     Follow-up: 12 weeks via phone    Jay May MD, FAAD, FACP     Departments of Internal Medicine and Dermatology  [facility]  [phone]    ____________________________________________    [facility]  Complaint   Patient presents with    Derm Problem     HS follow up.  Doing pretty well.  One new flare      HPI:  Ms. Queenie Rutledge is a(n) 39 year old female who presents today for follow-up  for HS.  Last seen 09/28/2023 virtual visit by me.   CUrrent HS regimen:   - Semaglutide 2.5mg. Possibly helping the HS  - Did not get resorcinol  - She did flare and require doxycyline which helped but took and while and was hard on her stomach  - She is still using clinamycin lotion.     One flare on rt thigh. 6/10 pain. Pain improving.    She is not sure if the acanthosis nigricans is better.    Patient is otherwise feeling well, without additional skin concerns.    HS Nurse Assessment        8/26/2021     9:19 AM 9/28/2023     4:42 PM 2/29/2024     3:16 PM   Nurse Assessment Data   Over the past 30 days how many old lesions flared back up?  0 0   Over the past 30 days how many new lesions did you get? 2 1 1   Over the past week, how many dressing changes do you do each day? 0 0 1   Over the past week, has your wound drainage been: None None Mild   Rate your HS overall from 0 - 10 (0 = no disease, 10 = worst) over the past week:  8 0 4   Rate your pain score from 0 - 10 (0 = no disease, 10 = worst) for the most painful/symptomatic lesion in the past week:  8 0 - No Pain 6   Over the past week, how much has HS influenced your quality of life? very much slightly slightly         Medications:  Current Outpatient Medications   Medication    chlorhexidine (HIBICLENS) 4 % liquid    Cholecalciferol (VITAMIN D HIGH POTENCY) 25 MCG (1000 UT) CAPS    clindamycin (CLEOCIN T) 1 % external lotion    Cyanocobalamin (VITAMIN B-12 PO)    doxycycline monohydrate (MONODOX) 100 MG capsule    hydrocortisone 2.5 % ointment    Hydroquinone 8 % EMUL    MedroxyPROGESTERone Acetate (DEPO-PROVERA IM)    Resorcinol POWD    scopolamine (TRANSDERM) 1 MG/3DAYS 72 hr patch    Semaglutide-Weight Management (WEGOVY) 2.4 MG/0.75ML pen     Semaglutide-Weight Management (WEGOVY) 2.4 MG/0.75ML pen    Semaglutide-Weight Management (WEGOVY) 2.4 MG/0.75ML pen    Semaglutide-Weight Management (WEGOVY) 2.4 MG/0.75ML pen    tacrolimus (PROTOPIC) 0.1 % external ointment     No current facility-administered medications for this visit.      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

## 2024-02-29 NOTE — LETTER
"    Queenie Rutledge  [address] [address]  : 1985  MRN:  [id number]      2024      To Whom This May Concern,       We are writing to request coverage of long-pulsed 1064-nm Nd: YAG laser treatment for hidradenitis suppurativa with significant effect on quality of life. This patient has symptoms of including intermittent folliculitis, inflammatory nodules and intermittent abscesses involving the thighs, groin and axillae.      Hidradenitis responds well to ND-YAG laser hair removal as it is a follicular process. We would expect 1 treatment every 3- 6 weeks for a maximum of 12 treatments.   .   Finally, this treatment has been recommended in some situations by the North American HS Management Guidelines (J Am Acad Dermatol. 2020 Nov;83(5):6777-5008)    Please, see the following references:   Karine Peralta et al. \"Randomized control trial for the treatment of hidradenitis suppurativa with a neodymium?doped yttrium aluminium garnet laser.\" Dermatologic surgery 35.8 (2009): 5920-9498.     Zeny Saeed, et al. \"Histopathologic study of hidradenitis suppurativa following long-pulsed 1064-nm Nd: YAG laser treatment.\" Archives of dermatology 147.1 (2011): 21-28.   Josh Agustin., et al. \"Prospective controlled clinical and histopathologic study of hidradenitis suppurativa treated with the long-pulsed neodymium: yttrium-aluminium-garnet laser.\" Journal of the American Academy of Dermatology 62.4 (2010): 637-645.     We strive to provide our patient with outstanding care. Therefore, I request you please contact me at [phone] if you have any questions regarding coverage or our treatment rational.     Jay May MD, FAAD, FACP      Department of Dermatology   [facility]   Phone([facility]): [phone]                         "

## 2024-03-01 NOTE — PATIENT INSTRUCTIONS
Dermatitis for eyelids, arm pit and left leg  - Protopic ointment twice a day     Hyperpigmentation of face and ears   - Hydroquinone 8%  + kojic acid through skin medicinals    HS  - Continue semaglutide 2.5mg weekly   - Flares:     - Continue clindamycin lotion twice a day as needed for flares  - Batcrim twice a day for 2 weeks for flares   - Resorcinol cream for flares twice a day as needed ([facility]  [facility]  Address: [address] [place] [address], [place] [address]  Phone: [phone]      Consider looking into these doctors to do ND-YAG laser to see if they would do it with insurance:  - [facility]   - [facility] ([place])

## 2024-08-27 ENCOUNTER — TELEPHONE (OUTPATIENT)
Dept: DERMATOLOGY | Facility: CLINIC | Age: 39
End: 2024-08-27
Payer: COMMERCIAL

## 2024-08-27 NOTE — TELEPHONE ENCOUNTER
1st attempt to take pt off the ndyag wait list. Called and Lvm for her to return our call at [phone]. Please schedule telephone consult with Dr Valencia if she would like to pursue laser with us.    Karmen Jones RN on 8/27/2024 at 2:19 PM

## 2024-09-04 NOTE — TELEPHONE ENCOUNTER
3rd attempt (2 calls, and mychart) to reach pt to take off wait list, no answer. LVM for her to return our call at [phone].      Pt read [employer] and did not respond. Removing from list.    Karmen Jones RN on 9/4/2024 at 9:03 AM

## 2024-09-09 ENCOUNTER — TELEPHONE (OUTPATIENT)
Dept: DERMATOLOGY | Facility: CLINIC | Age: 39
End: 2024-09-09
Payer: COMMERCIAL

## 2024-09-09 NOTE — TELEPHONE ENCOUNTER
Returned call to pt and scheduled pt for ndyag consult with Annie.  Karmen Jones RN on 9/9/2024 at 11:19 AM

## 2024-09-09 NOTE — TELEPHONE ENCOUNTER
PT is interested in doing laser treatments and taken off the wait list.    Franklin Singh on 9/9/2024 at 8:57 AM

## 2024-12-14 ENCOUNTER — OFFICE VISIT (OUTPATIENT)
Dept: OPHTHALMOLOGY | Facility: CLINIC | Age: 39
End: 2024-12-14
Payer: COMMERCIAL

## 2024-12-14 DIAGNOSIS — H52.13 MYOPIA OF BOTH EYES: ICD-10-CM

## 2024-12-14 DIAGNOSIS — H40.003 GLAUCOMA SUSPECT OF BOTH EYES: Primary | ICD-10-CM

## 2024-12-14 PROCEDURE — 92004 COMPRE OPH EXAM NEW PT 1/>: CPT | Performed by: OPTOMETRIST

## 2024-12-14 PROCEDURE — 92015 DETERMINE REFRACTIVE STATE: CPT | Performed by: OPTOMETRIST

## 2024-12-14 ASSESSMENT — REFRACTION_WEARINGRX
OD_AXIS: 104
OS_SPHERE: -0.75
SPECS_TYPE: SVL
OS_CYLINDER: SPHERE
OD_SPHERE: -1.50
OD_CYLINDER: +0.50

## 2024-12-14 ASSESSMENT — TONOMETRY
OS_IOP_MMHG: 35
OD_IOP_MMHG: 34
OD_IOP_MMHG: 30
OS_IOP_MMHG: 32
IOP_METHOD: ICARE
IOP_METHOD: APPLANATION

## 2024-12-14 ASSESSMENT — SLIT LAMP EXAM - LIDS
COMMENTS: NORMAL
COMMENTS: NORMAL

## 2024-12-14 ASSESSMENT — REFRACTION_MANIFEST
OS_CYLINDER: SPHERE
OD_CYLINDER: +0.50
OS_SPHERE: -0.75
OD_SPHERE: -1.75
OD_AXIS: 110

## 2024-12-14 ASSESSMENT — CONF VISUAL FIELD
OS_INFERIOR_TEMPORAL_RESTRICTION: 0
OD_INFERIOR_TEMPORAL_RESTRICTION: 0
METHOD: COUNTING FINGERS
OD_INFERIOR_NASAL_RESTRICTION: 0
OS_NORMAL: 1
OD_NORMAL: 1
OS_SUPERIOR_NASAL_RESTRICTION: 0
OD_SUPERIOR_NASAL_RESTRICTION: 0
OS_INFERIOR_NASAL_RESTRICTION: 0
OD_SUPERIOR_TEMPORAL_RESTRICTION: 0
OS_SUPERIOR_TEMPORAL_RESTRICTION: 0

## 2024-12-14 ASSESSMENT — VISUAL ACUITY
OD_CC+: -1
OS_CC: 20/20
METHOD: SNELLEN - LINEAR
OD_CC: 20/20
CORRECTION_TYPE: GLASSES

## 2024-12-14 ASSESSMENT — CUP TO DISC RATIO
OD_RATIO: 0.25
OS_RATIO: 0.25

## 2024-12-14 ASSESSMENT — EXTERNAL EXAM - RIGHT EYE: OD_EXAM: NORMAL

## 2024-12-14 ASSESSMENT — EXTERNAL EXAM - LEFT EYE: OS_EXAM: NORMAL

## 2024-12-14 NOTE — PATIENT INSTRUCTIONS
History of elevated IOPs  Explained glaucoma   New prescription for glasses   Needs pach, VF and RFNL at [facility]

## 2024-12-14 NOTE — PROGRESS NOTES
Assessment & Plan       Queenie Rutledge is a 39 year old female with the following diagnoses:   1. Glaucoma suspect of both eyes - Both Eyes    2. Myopia of both eyes - Both Eyes          History of elevated IOPs  Explained glaucoma   New prescription for glasses   Needs pach, VF and RFNL at [facility]    Patient disposition:   No follow-ups on file.          Complete documentation of historical and exam elements from today's encounter can be found in the full encounter summary report (not reduplicated in this progress note). I personally obtained the chief complaint(s) and history of present illness.  I confirmed and edited as necessary the review of systems, past medical/surgical history, family history, social history, and examination findings as documented by others; and I examined the patient myself. I personally reviewed the relevant tests, images, and reports as documented above. I formulated and edited as necessary the assessment and plan and discussed the findings and management plan with the patient and family.  Dr. Chidi Rider